# Patient Record
Sex: MALE | Race: BLACK OR AFRICAN AMERICAN | NOT HISPANIC OR LATINO | Employment: UNEMPLOYED | URBAN - METROPOLITAN AREA
[De-identification: names, ages, dates, MRNs, and addresses within clinical notes are randomized per-mention and may not be internally consistent; named-entity substitution may affect disease eponyms.]

---

## 2017-01-04 ENCOUNTER — HOSPITAL ENCOUNTER (INPATIENT)
Facility: HOSPITAL | Age: 42
LOS: 8 days | Discharge: HOME | DRG: 885 | End: 2017-01-13
Attending: EMERGENCY MEDICINE | Admitting: PSYCHIATRY & NEUROLOGY
Payer: COMMERCIAL

## 2017-01-04 DIAGNOSIS — R74.8 ELEVATED LIVER ENZYMES: ICD-10-CM

## 2017-01-04 DIAGNOSIS — F32.A DEPRESSION: ICD-10-CM

## 2017-01-04 DIAGNOSIS — M79.2 NEUROPATHIC PAIN SYNDROME (NON-HERPETIC): ICD-10-CM

## 2017-01-04 DIAGNOSIS — Z72.0 TOBACCO USE: ICD-10-CM

## 2017-01-04 DIAGNOSIS — E55.9 VITAMIN D DEFICIENCY: ICD-10-CM

## 2017-01-04 DIAGNOSIS — Z86.711 HISTORY OF PULMONARY EMBOLISM: ICD-10-CM

## 2017-01-04 DIAGNOSIS — F31.64 BIPOLAR DISORDER, CURRENT EPISODE MIXED, SEVERE, WITH PSYCHOTIC FEATURES (HCC): Primary | ICD-10-CM

## 2017-01-04 DIAGNOSIS — B19.20 HEPATITIS C VIRUS INFECTION WITHOUT HEPATIC COMA, UNSPECIFIED CHRONICITY: Chronic | ICD-10-CM

## 2017-01-04 LAB
AMPHETAMINES SERPL QL SCN: NEGATIVE
BACTERIA UR QL AUTO: ABNORMAL /HPF
BARBITURATES UR QL: NEGATIVE
BENZODIAZ UR QL: NEGATIVE
BILIRUB UR QL STRIP: ABNORMAL
CLARITY UR: CLEAR
CLARITY, POC: CLEAR
COCAINE UR QL: POSITIVE
COLOR UR: ABNORMAL
COLOR, POC: NORMAL
ETHANOL EXG-MCNC: 0 MG/DL
GLUCOSE UR STRIP-MCNC: NEGATIVE MG/DL
HGB UR QL STRIP.AUTO: NEGATIVE
HYALINE CASTS #/AREA URNS LPF: ABNORMAL /LPF
KETONES UR STRIP-MCNC: ABNORMAL MG/DL
LEUKOCYTE ESTERASE UR QL STRIP: NEGATIVE
METHADONE UR QL: NEGATIVE
NITRITE UR QL STRIP: NEGATIVE
NON-SQ EPI CELLS URNS QL MICRO: ABNORMAL /HPF
OPIATES UR QL SCN: NEGATIVE
PCP UR QL: NEGATIVE
PH UR STRIP.AUTO: 5.5 [PH] (ref 4.5–8)
PROT UR STRIP-MCNC: ABNORMAL MG/DL
RBC #/AREA URNS AUTO: ABNORMAL /HPF
SP GR UR STRIP.AUTO: >=1.03 (ref 1–1.03)
THC UR QL: POSITIVE
UROBILINOGEN UR QL STRIP.AUTO: 4 E.U./DL
WBC #/AREA URNS AUTO: ABNORMAL /HPF

## 2017-01-04 PROCEDURE — 80307 DRUG TEST PRSMV CHEM ANLYZR: CPT | Performed by: EMERGENCY MEDICINE

## 2017-01-04 PROCEDURE — 82075 ASSAY OF BREATH ETHANOL: CPT | Performed by: EMERGENCY MEDICINE

## 2017-01-04 PROCEDURE — 81002 URINALYSIS NONAUTO W/O SCOPE: CPT | Performed by: EMERGENCY MEDICINE

## 2017-01-04 PROCEDURE — 81001 URINALYSIS AUTO W/SCOPE: CPT

## 2017-01-05 PROCEDURE — 99285 EMERGENCY DEPT VISIT HI MDM: CPT

## 2017-01-05 RX ORDER — OLANZAPINE 5 MG/1
5 TABLET ORAL
Status: DISCONTINUED | OUTPATIENT
Start: 2017-01-05 | End: 2017-01-13 | Stop reason: HOSPADM

## 2017-01-05 RX ORDER — HALOPERIDOL 5 MG
5 TABLET ORAL EVERY 8 HOURS PRN
Status: DISCONTINUED | OUTPATIENT
Start: 2017-01-05 | End: 2017-01-13 | Stop reason: HOSPADM

## 2017-01-05 RX ORDER — BENZTROPINE MESYLATE 1 MG/ML
1 INJECTION INTRAMUSCULAR; INTRAVENOUS
Status: DISCONTINUED | OUTPATIENT
Start: 2017-01-05 | End: 2017-01-13 | Stop reason: HOSPADM

## 2017-01-05 RX ORDER — OLANZAPINE 10 MG/1
10 INJECTION, POWDER, LYOPHILIZED, FOR SOLUTION INTRAMUSCULAR
Status: DISCONTINUED | OUTPATIENT
Start: 2017-01-05 | End: 2017-01-13 | Stop reason: HOSPADM

## 2017-01-05 RX ORDER — LIDOCAINE 50 MG/G
1 PATCH TOPICAL DAILY
Status: DISCONTINUED | OUTPATIENT
Start: 2017-01-06 | End: 2017-01-13 | Stop reason: HOSPADM

## 2017-01-05 RX ORDER — BENZTROPINE MESYLATE 1 MG/1
1 TABLET ORAL
Status: DISCONTINUED | OUTPATIENT
Start: 2017-01-05 | End: 2017-01-13 | Stop reason: HOSPADM

## 2017-01-05 RX ADMIN — APIXABAN 5 MG: 5 TABLET, FILM COATED ORAL at 15:12

## 2017-01-05 RX ADMIN — APIXABAN 5 MG: 5 TABLET, FILM COATED ORAL at 18:51

## 2017-01-06 PROBLEM — F33.2 MDD (MAJOR DEPRESSIVE DISORDER), RECURRENT SEVERE, WITHOUT PSYCHOSIS (HCC): Status: ACTIVE | Noted: 2017-01-06

## 2017-01-06 PROBLEM — F43.10 PTSD (POST-TRAUMATIC STRESS DISORDER): Status: ACTIVE | Noted: 2017-01-06

## 2017-01-06 LAB
25(OH)D3 SERPL-MCNC: 9.2 NG/ML (ref 30–100)
ALBUMIN SERPL BCP-MCNC: 3.1 G/DL (ref 3.5–5)
ALP SERPL-CCNC: 62 U/L (ref 46–116)
ALT SERPL W P-5'-P-CCNC: 266 U/L (ref 12–78)
ANION GAP SERPL CALCULATED.3IONS-SCNC: 5 MMOL/L (ref 4–13)
AST SERPL W P-5'-P-CCNC: 125 U/L (ref 5–45)
BASOPHILS # BLD AUTO: 0.04 THOUSANDS/ΜL (ref 0–0.1)
BASOPHILS NFR BLD AUTO: 1 % (ref 0–1)
BILIRUB SERPL-MCNC: 0.23 MG/DL (ref 0.2–1)
BUN SERPL-MCNC: 11 MG/DL (ref 5–25)
CALCIUM SERPL-MCNC: 8.8 MG/DL (ref 8.3–10.1)
CHLORIDE SERPL-SCNC: 106 MMOL/L (ref 100–108)
CHOLEST SERPL-MCNC: 173 MG/DL (ref 50–200)
CO2 SERPL-SCNC: 31 MMOL/L (ref 21–32)
CREAT SERPL-MCNC: 1.18 MG/DL (ref 0.6–1.3)
EOSINOPHIL # BLD AUTO: 0.33 THOUSAND/ΜL (ref 0–0.61)
EOSINOPHIL NFR BLD AUTO: 5 % (ref 0–6)
ERYTHROCYTE [DISTWIDTH] IN BLOOD BY AUTOMATED COUNT: 13.4 % (ref 11.6–15.1)
FOLATE SERPL-MCNC: 9.1 NG/ML (ref 3.1–17.5)
GFR SERPL CREATININE-BSD FRML MDRD: >60 ML/MIN/1.73SQ M
GLUCOSE SERPL-MCNC: 89 MG/DL (ref 65–140)
HCT VFR BLD AUTO: 42.6 % (ref 36.5–49.3)
HDLC SERPL-MCNC: 41 MG/DL (ref 40–60)
HGB BLD-MCNC: 14 G/DL (ref 12–17)
LDLC SERPL CALC-MCNC: 114 MG/DL (ref 0–100)
LYMPHOCYTES # BLD AUTO: 2.67 THOUSANDS/ΜL (ref 0.6–4.47)
LYMPHOCYTES NFR BLD AUTO: 37 % (ref 14–44)
MCH RBC QN AUTO: 29.5 PG (ref 26.8–34.3)
MCHC RBC AUTO-ENTMCNC: 32.9 G/DL (ref 31.4–37.4)
MCV RBC AUTO: 90 FL (ref 82–98)
MONOCYTES # BLD AUTO: 1.16 THOUSAND/ΜL (ref 0.17–1.22)
MONOCYTES NFR BLD AUTO: 16 % (ref 4–12)
NEUTROPHILS # BLD AUTO: 3.07 THOUSANDS/ΜL (ref 1.85–7.62)
NEUTS SEG NFR BLD AUTO: 41 % (ref 43–75)
NRBC BLD AUTO-RTO: 0 /100 WBCS
PLATELET # BLD AUTO: 315 THOUSANDS/UL (ref 149–390)
PMV BLD AUTO: 9.3 FL (ref 8.9–12.7)
POTASSIUM SERPL-SCNC: 4.5 MMOL/L (ref 3.5–5.3)
PROT SERPL-MCNC: 7.3 G/DL (ref 6.4–8.2)
RBC # BLD AUTO: 4.74 MILLION/UL (ref 3.88–5.62)
SODIUM SERPL-SCNC: 142 MMOL/L (ref 136–145)
TRIGL SERPL-MCNC: 90 MG/DL
VIT B12 SERPL-MCNC: 573 PG/ML (ref 100–900)
WBC # BLD AUTO: 7.28 THOUSAND/UL (ref 4.31–10.16)

## 2017-01-06 PROCEDURE — 85025 COMPLETE CBC W/AUTO DIFF WBC: CPT | Performed by: PSYCHIATRY & NEUROLOGY

## 2017-01-06 PROCEDURE — 82746 ASSAY OF FOLIC ACID SERUM: CPT | Performed by: PSYCHIATRY & NEUROLOGY

## 2017-01-06 PROCEDURE — 80061 LIPID PANEL: CPT | Performed by: PSYCHIATRY & NEUROLOGY

## 2017-01-06 PROCEDURE — 82607 VITAMIN B-12: CPT | Performed by: PSYCHIATRY & NEUROLOGY

## 2017-01-06 PROCEDURE — 82306 VITAMIN D 25 HYDROXY: CPT | Performed by: PSYCHIATRY & NEUROLOGY

## 2017-01-06 PROCEDURE — 80053 COMPREHEN METABOLIC PANEL: CPT | Performed by: PSYCHIATRY & NEUROLOGY

## 2017-01-06 RX ORDER — MIRTAZAPINE 15 MG/1
15 TABLET, FILM COATED ORAL
Status: DISCONTINUED | OUTPATIENT
Start: 2017-01-06 | End: 2017-01-08

## 2017-01-06 RX ADMIN — MIRTAZAPINE 15 MG: 15 TABLET, FILM COATED ORAL at 21:33

## 2017-01-06 RX ADMIN — APIXABAN 5 MG: 5 TABLET, FILM COATED ORAL at 08:36

## 2017-01-06 RX ADMIN — LIDOCAINE 1 PATCH: 50 PATCH CUTANEOUS at 08:36

## 2017-01-06 RX ADMIN — APIXABAN 5 MG: 5 TABLET, FILM COATED ORAL at 17:17

## 2017-01-07 PROBLEM — S06.0XAA CONCUSSION INJURY OF BODY STRUCTURE: Status: ACTIVE | Noted: 2017-01-07

## 2017-01-07 PROBLEM — Z72.0 TOBACCO USE: Status: ACTIVE | Noted: 2017-01-07

## 2017-01-07 PROBLEM — S06.0X9A CONCUSSION INJURY OF BODY STRUCTURE: Status: ACTIVE | Noted: 2017-01-07

## 2017-01-07 PROBLEM — F12.10 CANNABIS ABUSE, EPISODIC: Chronic | Status: ACTIVE | Noted: 2017-01-07

## 2017-01-07 PROBLEM — S69.90XA HAND INJURIES: Status: ACTIVE | Noted: 2017-01-07

## 2017-01-07 PROBLEM — F43.10 PTSD (POST-TRAUMATIC STRESS DISORDER): Chronic | Status: ACTIVE | Noted: 2017-01-06

## 2017-01-07 PROBLEM — F33.3 MAJOR DEPRESSIVE DISORDER, RECURRENT, SEVERE WITH PSYCHOTIC FEATURES (HCC): Chronic | Status: ACTIVE | Noted: 2017-01-06

## 2017-01-07 PROBLEM — F14.10 COCAINE ABUSE, EPISODIC (HCC): Chronic | Status: ACTIVE | Noted: 2017-01-07

## 2017-01-07 RX ORDER — PALIPERIDONE 3 MG/1
3 TABLET, EXTENDED RELEASE ORAL DAILY
Status: DISCONTINUED | OUTPATIENT
Start: 2017-01-07 | End: 2017-01-08

## 2017-01-07 RX ORDER — MELATONIN
1000 DAILY
Status: DISCONTINUED | OUTPATIENT
Start: 2017-01-07 | End: 2017-01-13 | Stop reason: HOSPADM

## 2017-01-07 RX ORDER — GABAPENTIN 100 MG/1
100 CAPSULE ORAL 3 TIMES DAILY
Status: DISCONTINUED | OUTPATIENT
Start: 2017-01-07 | End: 2017-01-09

## 2017-01-07 RX ADMIN — MIRTAZAPINE 15 MG: 15 TABLET, FILM COATED ORAL at 21:29

## 2017-01-07 RX ADMIN — GABAPENTIN 100 MG: 100 CAPSULE ORAL at 21:29

## 2017-01-07 RX ADMIN — VITAMIN D, TAB 1000IU (100/BT) 1000 UNITS: 25 TAB at 21:28

## 2017-01-07 RX ADMIN — LIDOCAINE 1 PATCH: 50 PATCH CUTANEOUS at 09:08

## 2017-01-07 RX ADMIN — PALIPERIDONE 3 MG: 3 TABLET, EXTENDED RELEASE ORAL at 14:01

## 2017-01-07 RX ADMIN — GABAPENTIN 100 MG: 100 CAPSULE ORAL at 14:00

## 2017-01-07 RX ADMIN — APIXABAN 5 MG: 5 TABLET, FILM COATED ORAL at 09:08

## 2017-01-07 RX ADMIN — APIXABAN 5 MG: 5 TABLET, FILM COATED ORAL at 17:57

## 2017-01-07 RX ADMIN — GABAPENTIN 100 MG: 100 CAPSULE ORAL at 16:36

## 2017-01-08 RX ORDER — PALIPERIDONE 6 MG/1
6 TABLET, EXTENDED RELEASE ORAL DAILY
Status: DISCONTINUED | OUTPATIENT
Start: 2017-01-09 | End: 2017-01-10

## 2017-01-08 RX ORDER — MIRTAZAPINE 30 MG/1
30 TABLET, FILM COATED ORAL
Status: DISCONTINUED | OUTPATIENT
Start: 2017-01-08 | End: 2017-01-09

## 2017-01-08 RX ADMIN — VITAMIN D, TAB 1000IU (100/BT) 1000 UNITS: 25 TAB at 08:55

## 2017-01-08 RX ADMIN — APIXABAN 5 MG: 5 TABLET, FILM COATED ORAL at 08:56

## 2017-01-08 RX ADMIN — PALIPERIDONE 3 MG: 3 TABLET, EXTENDED RELEASE ORAL at 08:55

## 2017-01-08 RX ADMIN — GABAPENTIN 100 MG: 100 CAPSULE ORAL at 08:55

## 2017-01-08 RX ADMIN — GABAPENTIN 100 MG: 100 CAPSULE ORAL at 16:50

## 2017-01-08 RX ADMIN — APIXABAN 5 MG: 5 TABLET, FILM COATED ORAL at 18:06

## 2017-01-08 RX ADMIN — MIRTAZAPINE 30 MG: 30 TABLET, FILM COATED ORAL at 21:34

## 2017-01-08 RX ADMIN — LIDOCAINE 1 PATCH: 50 PATCH CUTANEOUS at 08:55

## 2017-01-08 RX ADMIN — GABAPENTIN 100 MG: 100 CAPSULE ORAL at 21:32

## 2017-01-09 RX ORDER — GABAPENTIN 300 MG/1
300 CAPSULE ORAL 3 TIMES DAILY
Status: DISCONTINUED | OUTPATIENT
Start: 2017-01-09 | End: 2017-01-10

## 2017-01-09 RX ADMIN — APIXABAN 5 MG: 5 TABLET, FILM COATED ORAL at 08:13

## 2017-01-09 RX ADMIN — MIRTAZAPINE 45 MG: 15 TABLET, FILM COATED ORAL at 21:30

## 2017-01-09 RX ADMIN — GABAPENTIN 300 MG: 300 CAPSULE ORAL at 21:30

## 2017-01-09 RX ADMIN — PALIPERIDONE 6 MG: 6 TABLET, EXTENDED RELEASE ORAL at 09:10

## 2017-01-09 RX ADMIN — GABAPENTIN 100 MG: 100 CAPSULE ORAL at 08:13

## 2017-01-09 RX ADMIN — LIDOCAINE 1 PATCH: 50 PATCH CUTANEOUS at 08:13

## 2017-01-09 RX ADMIN — APIXABAN 5 MG: 5 TABLET, FILM COATED ORAL at 17:08

## 2017-01-09 RX ADMIN — VITAMIN D, TAB 1000IU (100/BT) 1000 UNITS: 25 TAB at 08:13

## 2017-01-09 RX ADMIN — GABAPENTIN 300 MG: 300 CAPSULE ORAL at 16:20

## 2017-01-10 LAB
ALBUMIN SERPL BCP-MCNC: 3.1 G/DL (ref 3.5–5)
ALP SERPL-CCNC: 78 U/L (ref 46–116)
ALT SERPL W P-5'-P-CCNC: 433 U/L (ref 12–78)
AST SERPL W P-5'-P-CCNC: 190 U/L (ref 5–45)
BILIRUB DIRECT SERPL-MCNC: 0.16 MG/DL (ref 0–0.2)
BILIRUB SERPL-MCNC: 0.34 MG/DL (ref 0.2–1)
PROT SERPL-MCNC: 7.6 G/DL (ref 6.4–8.2)

## 2017-01-10 PROCEDURE — 80076 HEPATIC FUNCTION PANEL: CPT | Performed by: PSYCHIATRY & NEUROLOGY

## 2017-01-10 RX ORDER — RISPERIDONE 1 MG/1
2 TABLET, FILM COATED ORAL
Status: DISCONTINUED | OUTPATIENT
Start: 2017-01-10 | End: 2017-01-13 | Stop reason: HOSPADM

## 2017-01-10 RX ORDER — GABAPENTIN 400 MG/1
400 CAPSULE ORAL 3 TIMES DAILY
Status: DISCONTINUED | OUTPATIENT
Start: 2017-01-10 | End: 2017-01-13 | Stop reason: HOSPADM

## 2017-01-10 RX ORDER — MIRTAZAPINE 30 MG/1
30 TABLET, FILM COATED ORAL
Status: DISCONTINUED | OUTPATIENT
Start: 2017-01-10 | End: 2017-01-12

## 2017-01-10 RX ADMIN — RISPERIDONE 2 MG: 1 TABLET ORAL at 21:27

## 2017-01-10 RX ADMIN — PALIPERIDONE 6 MG: 6 TABLET, EXTENDED RELEASE ORAL at 08:12

## 2017-01-10 RX ADMIN — GABAPENTIN 400 MG: 400 CAPSULE ORAL at 21:27

## 2017-01-10 RX ADMIN — GABAPENTIN 400 MG: 400 CAPSULE ORAL at 17:00

## 2017-01-10 RX ADMIN — APIXABAN 5 MG: 5 TABLET, FILM COATED ORAL at 08:12

## 2017-01-10 RX ADMIN — MIRTAZAPINE 30 MG: 30 TABLET, FILM COATED ORAL at 21:27

## 2017-01-10 RX ADMIN — APIXABAN 5 MG: 5 TABLET, FILM COATED ORAL at 18:16

## 2017-01-10 RX ADMIN — GABAPENTIN 300 MG: 300 CAPSULE ORAL at 08:12

## 2017-01-10 RX ADMIN — VITAMIN D, TAB 1000IU (100/BT) 1000 UNITS: 25 TAB at 08:12

## 2017-01-10 RX ADMIN — LIDOCAINE 1 PATCH: 50 PATCH CUTANEOUS at 08:12

## 2017-01-11 RX ADMIN — APIXABAN 5 MG: 5 TABLET, FILM COATED ORAL at 09:57

## 2017-01-11 RX ADMIN — GABAPENTIN 400 MG: 400 CAPSULE ORAL at 21:30

## 2017-01-11 RX ADMIN — RISPERIDONE 2 MG: 1 TABLET ORAL at 21:30

## 2017-01-11 RX ADMIN — MIRTAZAPINE 30 MG: 30 TABLET, FILM COATED ORAL at 21:30

## 2017-01-11 RX ADMIN — APIXABAN 5 MG: 5 TABLET, FILM COATED ORAL at 17:56

## 2017-01-11 RX ADMIN — LIDOCAINE 1 PATCH: 50 PATCH CUTANEOUS at 09:04

## 2017-01-11 RX ADMIN — VITAMIN D, TAB 1000IU (100/BT) 1000 UNITS: 25 TAB at 09:04

## 2017-01-11 RX ADMIN — GABAPENTIN 400 MG: 400 CAPSULE ORAL at 09:04

## 2017-01-11 RX ADMIN — GABAPENTIN 400 MG: 400 CAPSULE ORAL at 16:46

## 2017-01-12 ENCOUNTER — APPOINTMENT (INPATIENT)
Dept: RADIOLOGY | Facility: HOSPITAL | Age: 42
DRG: 885 | End: 2017-01-12
Payer: COMMERCIAL

## 2017-01-12 LAB
ALBUMIN SERPL BCP-MCNC: 3.1 G/DL (ref 3.5–5)
ALP SERPL-CCNC: 79 U/L (ref 46–116)
ALT SERPL W P-5'-P-CCNC: 503 U/L (ref 12–78)
ANION GAP SERPL CALCULATED.3IONS-SCNC: 6 MMOL/L (ref 4–13)
AST SERPL W P-5'-P-CCNC: 220 U/L (ref 5–45)
BILIRUB SERPL-MCNC: 0.27 MG/DL (ref 0.2–1)
BUN SERPL-MCNC: 10 MG/DL (ref 5–25)
CALCIUM SERPL-MCNC: 8.6 MG/DL (ref 8.3–10.1)
CHLORIDE SERPL-SCNC: 104 MMOL/L (ref 100–108)
CO2 SERPL-SCNC: 31 MMOL/L (ref 21–32)
CREAT SERPL-MCNC: 1.06 MG/DL (ref 0.6–1.3)
GFR SERPL CREATININE-BSD FRML MDRD: >60 ML/MIN/1.73SQ M
GLUCOSE SERPL-MCNC: 103 MG/DL (ref 65–140)
POTASSIUM SERPL-SCNC: 4.1 MMOL/L (ref 3.5–5.3)
PROT SERPL-MCNC: 7.2 G/DL (ref 6.4–8.2)
SODIUM SERPL-SCNC: 141 MMOL/L (ref 136–145)

## 2017-01-12 PROCEDURE — 86038 ANTINUCLEAR ANTIBODIES: CPT | Performed by: INTERNAL MEDICINE

## 2017-01-12 PROCEDURE — 86235 NUCLEAR ANTIGEN ANTIBODY: CPT | Performed by: INTERNAL MEDICINE

## 2017-01-12 PROCEDURE — 76705 ECHO EXAM OF ABDOMEN: CPT

## 2017-01-12 PROCEDURE — 86376 MICROSOMAL ANTIBODY EACH: CPT | Performed by: INTERNAL MEDICINE

## 2017-01-12 PROCEDURE — 80053 COMPREHEN METABOLIC PANEL: CPT | Performed by: PHYSICIAN ASSISTANT

## 2017-01-12 RX ORDER — MIRTAZAPINE 15 MG/1
7.5 TABLET, FILM COATED ORAL
Status: DISCONTINUED | OUTPATIENT
Start: 2017-01-12 | End: 2017-01-13

## 2017-01-12 RX ORDER — MIRTAZAPINE 15 MG/1
15 TABLET, FILM COATED ORAL
Status: DISCONTINUED | OUTPATIENT
Start: 2017-01-12 | End: 2017-01-12

## 2017-01-12 RX ADMIN — RISPERIDONE 2 MG: 1 TABLET ORAL at 21:31

## 2017-01-12 RX ADMIN — APIXABAN 5 MG: 5 TABLET, FILM COATED ORAL at 17:06

## 2017-01-12 RX ADMIN — MIRTAZAPINE 7.5 MG: 15 TABLET, FILM COATED ORAL at 21:33

## 2017-01-12 RX ADMIN — GABAPENTIN 400 MG: 400 CAPSULE ORAL at 16:36

## 2017-01-12 RX ADMIN — APIXABAN 5 MG: 5 TABLET, FILM COATED ORAL at 09:06

## 2017-01-12 RX ADMIN — GABAPENTIN 400 MG: 400 CAPSULE ORAL at 09:06

## 2017-01-12 RX ADMIN — VITAMIN D, TAB 1000IU (100/BT) 1000 UNITS: 25 TAB at 09:06

## 2017-01-12 RX ADMIN — LIDOCAINE 1 PATCH: 50 PATCH CUTANEOUS at 09:06

## 2017-01-12 RX ADMIN — GABAPENTIN 400 MG: 400 CAPSULE ORAL at 21:31

## 2017-01-13 VITALS
BODY MASS INDEX: 29.99 KG/M2 | RESPIRATION RATE: 16 BRPM | HEART RATE: 72 BPM | HEIGHT: 65 IN | OXYGEN SATURATION: 100 % | TEMPERATURE: 98.3 F | DIASTOLIC BLOOD PRESSURE: 70 MMHG | WEIGHT: 180 LBS | SYSTOLIC BLOOD PRESSURE: 127 MMHG

## 2017-01-13 LAB
ACTIN IGG SERPL-ACNC: 13 UNITS (ref 0–19)
ALBUMIN SERPL BCP-MCNC: 2.9 G/DL (ref 3.5–5)
ALP SERPL-CCNC: 81 U/L (ref 46–116)
ALT SERPL W P-5'-P-CCNC: 521 U/L (ref 12–78)
AST SERPL W P-5'-P-CCNC: 219 U/L (ref 5–45)
BILIRUB DIRECT SERPL-MCNC: 0.11 MG/DL (ref 0–0.2)
BILIRUB SERPL-MCNC: 0.22 MG/DL (ref 0.2–1)
LKM-1 AB SER-ACNC: 0.7 UNITS (ref 0–20)
PROT SERPL-MCNC: 7.2 G/DL (ref 6.4–8.2)
RYE IGE QN: NEGATIVE

## 2017-01-13 PROCEDURE — 80076 HEPATIC FUNCTION PANEL: CPT | Performed by: PSYCHIATRY & NEUROLOGY

## 2017-01-13 RX ORDER — GABAPENTIN 400 MG/1
400 CAPSULE ORAL 3 TIMES DAILY
Qty: 90 CAPSULE | Refills: 0 | Status: SHIPPED | OUTPATIENT
Start: 2017-01-13 | End: 2017-03-31 | Stop reason: HOSPADM

## 2017-01-13 RX ORDER — RISPERIDONE 2 MG/1
2 TABLET, FILM COATED ORAL
Qty: 30 TABLET | Refills: 0 | Status: SHIPPED | OUTPATIENT
Start: 2017-01-13 | End: 2017-03-31 | Stop reason: HOSPADM

## 2017-01-13 RX ORDER — LIDOCAINE 50 MG/G
1 PATCH TOPICAL DAILY
Qty: 30 PATCH | Refills: 0 | Status: SHIPPED | OUTPATIENT
Start: 2017-01-13 | End: 2017-03-31 | Stop reason: HOSPADM

## 2017-01-13 RX ADMIN — APIXABAN 5 MG: 5 TABLET, FILM COATED ORAL at 09:16

## 2017-01-13 RX ADMIN — GABAPENTIN 400 MG: 400 CAPSULE ORAL at 09:16

## 2017-01-13 RX ADMIN — LIDOCAINE 1 PATCH: 50 PATCH CUTANEOUS at 09:21

## 2017-01-13 RX ADMIN — VITAMIN D, TAB 1000IU (100/BT) 1000 UNITS: 25 TAB at 09:16

## 2017-01-16 DIAGNOSIS — B19.20 VIRAL HEPATITIS C WITHOUT HEPATIC COMA: ICD-10-CM

## 2017-03-30 ENCOUNTER — APPOINTMENT (EMERGENCY)
Dept: RADIOLOGY | Facility: HOSPITAL | Age: 42
End: 2017-03-30
Payer: COMMERCIAL

## 2017-03-30 ENCOUNTER — HOSPITAL ENCOUNTER (OUTPATIENT)
Facility: HOSPITAL | Age: 42
Setting detail: OBSERVATION
Discharge: HOME | End: 2017-03-31
Attending: EMERGENCY MEDICINE | Admitting: INTERNAL MEDICINE
Payer: COMMERCIAL

## 2017-03-30 DIAGNOSIS — I26.99 PULMONARY EMBOLISM (HCC): Primary | ICD-10-CM

## 2017-03-30 DIAGNOSIS — R06.02 SHORTNESS OF BREATH: ICD-10-CM

## 2017-03-30 DIAGNOSIS — B19.20 HEPATITIS C: ICD-10-CM

## 2017-03-30 DIAGNOSIS — R07.9 CHEST PAIN: ICD-10-CM

## 2017-03-30 LAB
BASOPHILS # BLD AUTO: 0.02 THOUSANDS/ΜL (ref 0–0.1)
BASOPHILS NFR BLD AUTO: 0 % (ref 0–1)
EOSINOPHIL # BLD AUTO: 0.11 THOUSAND/ΜL (ref 0–0.61)
EOSINOPHIL NFR BLD AUTO: 1 % (ref 0–6)
ERYTHROCYTE [DISTWIDTH] IN BLOOD BY AUTOMATED COUNT: 14 % (ref 11.6–15.1)
HCT VFR BLD AUTO: 45.3 % (ref 36.5–49.3)
HGB BLD-MCNC: 15.3 G/DL (ref 12–17)
LYMPHOCYTES # BLD AUTO: 2.16 THOUSANDS/ΜL (ref 0.6–4.47)
LYMPHOCYTES NFR BLD AUTO: 22 % (ref 14–44)
MCH RBC QN AUTO: 30.5 PG (ref 26.8–34.3)
MCHC RBC AUTO-ENTMCNC: 33.8 G/DL (ref 31.4–37.4)
MCV RBC AUTO: 90 FL (ref 82–98)
MONOCYTES # BLD AUTO: 0.87 THOUSAND/ΜL (ref 0.17–1.22)
MONOCYTES NFR BLD AUTO: 9 % (ref 4–12)
NEUTROPHILS # BLD AUTO: 6.68 THOUSANDS/ΜL (ref 1.85–7.62)
NEUTS SEG NFR BLD AUTO: 68 % (ref 43–75)
NRBC BLD AUTO-RTO: 0 /100 WBCS
PLATELET # BLD AUTO: 369 THOUSANDS/UL (ref 149–390)
PMV BLD AUTO: 8.8 FL (ref 8.9–12.7)
RBC # BLD AUTO: 5.02 MILLION/UL (ref 3.88–5.62)
SPECIMEN SOURCE: NORMAL
TROPONIN I BLD-MCNC: 0 NG/ML (ref 0–0.08)
WBC # BLD AUTO: 9.85 THOUSAND/UL (ref 4.31–10.16)

## 2017-03-30 PROCEDURE — 80048 BASIC METABOLIC PNL TOTAL CA: CPT | Performed by: EMERGENCY MEDICINE

## 2017-03-30 PROCEDURE — 36415 COLL VENOUS BLD VENIPUNCTURE: CPT | Performed by: EMERGENCY MEDICINE

## 2017-03-30 PROCEDURE — 93005 ELECTROCARDIOGRAM TRACING: CPT | Performed by: EMERGENCY MEDICINE

## 2017-03-30 PROCEDURE — 85025 COMPLETE CBC W/AUTO DIFF WBC: CPT | Performed by: EMERGENCY MEDICINE

## 2017-03-30 PROCEDURE — 84484 ASSAY OF TROPONIN QUANT: CPT

## 2017-03-31 ENCOUNTER — APPOINTMENT (EMERGENCY)
Dept: RADIOLOGY | Facility: HOSPITAL | Age: 42
End: 2017-03-31
Payer: COMMERCIAL

## 2017-03-31 VITALS
BODY MASS INDEX: 31.11 KG/M2 | TEMPERATURE: 96.8 F | WEIGHT: 186.73 LBS | DIASTOLIC BLOOD PRESSURE: 58 MMHG | SYSTOLIC BLOOD PRESSURE: 108 MMHG | HEIGHT: 65 IN | OXYGEN SATURATION: 98 % | HEART RATE: 83 BPM | RESPIRATION RATE: 18 BRPM

## 2017-03-31 PROBLEM — F19.10 POLYSUBSTANCE ABUSE (HCC): Chronic | Status: ACTIVE | Noted: 2017-03-31

## 2017-03-31 LAB
ALBUMIN SERPL BCP-MCNC: 3 G/DL (ref 3.5–5)
ALP SERPL-CCNC: 64 U/L (ref 46–116)
ALT SERPL W P-5'-P-CCNC: 88 U/L (ref 12–78)
AMPHETAMINES SERPL QL SCN: NEGATIVE
ANION GAP SERPL CALCULATED.3IONS-SCNC: 6 MMOL/L (ref 4–13)
ANION GAP SERPL CALCULATED.3IONS-SCNC: 8 MMOL/L (ref 4–13)
AST SERPL W P-5'-P-CCNC: 21 U/L (ref 5–45)
ATRIAL RATE: 64 BPM
BARBITURATES UR QL: NEGATIVE
BENZODIAZ UR QL: NEGATIVE
BILIRUB SERPL-MCNC: 0.31 MG/DL (ref 0.2–1)
BUN SERPL-MCNC: 11 MG/DL (ref 5–25)
BUN SERPL-MCNC: 11 MG/DL (ref 5–25)
CALCIUM SERPL-MCNC: 8.6 MG/DL (ref 8.3–10.1)
CALCIUM SERPL-MCNC: 8.8 MG/DL (ref 8.3–10.1)
CHLORIDE SERPL-SCNC: 103 MMOL/L (ref 100–108)
CHLORIDE SERPL-SCNC: 104 MMOL/L (ref 100–108)
CO2 SERPL-SCNC: 30 MMOL/L (ref 21–32)
CO2 SERPL-SCNC: 32 MMOL/L (ref 21–32)
COCAINE UR QL: NEGATIVE
CREAT SERPL-MCNC: 1.04 MG/DL (ref 0.6–1.3)
CREAT SERPL-MCNC: 1.09 MG/DL (ref 0.6–1.3)
DEPRECATED AT III PPP: 92 % OF NORMAL (ref 92–136)
ERYTHROCYTE [DISTWIDTH] IN BLOOD BY AUTOMATED COUNT: 13.8 % (ref 11.6–15.1)
GFR SERPL CREATININE-BSD FRML MDRD: >60 ML/MIN/1.73SQ M
GFR SERPL CREATININE-BSD FRML MDRD: >60 ML/MIN/1.73SQ M
GLUCOSE SERPL-MCNC: 104 MG/DL (ref 65–140)
GLUCOSE SERPL-MCNC: 99 MG/DL (ref 65–140)
HCT VFR BLD AUTO: 43.8 % (ref 36.5–49.3)
HGB BLD-MCNC: 14.5 G/DL (ref 12–17)
HIV 1+2 AB+HIV1 P24 AG SERPL QL IA: NORMAL
HIV1 P24 AG SER QL: NORMAL
MCH RBC QN AUTO: 29.8 PG (ref 26.8–34.3)
MCHC RBC AUTO-ENTMCNC: 33.1 G/DL (ref 31.4–37.4)
MCV RBC AUTO: 90 FL (ref 82–98)
METHADONE UR QL: NEGATIVE
OPIATES UR QL SCN: POSITIVE
P AXIS: 72 DEGREES
PCP UR QL: POSITIVE
PLATELET # BLD AUTO: 367 THOUSANDS/UL (ref 149–390)
PMV BLD AUTO: 9.1 FL (ref 8.9–12.7)
POTASSIUM SERPL-SCNC: 3.6 MMOL/L (ref 3.5–5.3)
POTASSIUM SERPL-SCNC: 3.6 MMOL/L (ref 3.5–5.3)
PR INTERVAL: 142 MS
PROT SERPL-MCNC: 7.6 G/DL (ref 6.4–8.2)
QRS AXIS: 66 DEGREES
QRSD INTERVAL: 76 MS
QT INTERVAL: 402 MS
QTC INTERVAL: 414 MS
RBC # BLD AUTO: 4.86 MILLION/UL (ref 3.88–5.62)
SODIUM SERPL-SCNC: 141 MMOL/L (ref 136–145)
SODIUM SERPL-SCNC: 142 MMOL/L (ref 136–145)
T WAVE AXIS: 39 DEGREES
THC UR QL: POSITIVE
VENTRICULAR RATE: 64 BPM
WBC # BLD AUTO: 10.28 THOUSAND/UL (ref 4.31–10.16)

## 2017-03-31 PROCEDURE — 87806 HIV AG W/HIV1&2 ANTB W/OPTIC: CPT | Performed by: INTERNAL MEDICINE

## 2017-03-31 PROCEDURE — 85300 ANTITHROMBIN III ACTIVITY: CPT | Performed by: INTERNAL MEDICINE

## 2017-03-31 PROCEDURE — 85613 RUSSELL VIPER VENOM DILUTED: CPT | Performed by: INTERNAL MEDICINE

## 2017-03-31 PROCEDURE — 96372 THER/PROPH/DIAG INJ SC/IM: CPT

## 2017-03-31 PROCEDURE — 85732 THROMBOPLASTIN TIME PARTIAL: CPT | Performed by: INTERNAL MEDICINE

## 2017-03-31 PROCEDURE — 85306 CLOT INHIBIT PROT S FREE: CPT | Performed by: INTERNAL MEDICINE

## 2017-03-31 PROCEDURE — 71275 CT ANGIOGRAPHY CHEST: CPT

## 2017-03-31 PROCEDURE — 81241 F5 GENE: CPT | Performed by: INTERNAL MEDICINE

## 2017-03-31 PROCEDURE — 85670 THROMBIN TIME PLASMA: CPT | Performed by: INTERNAL MEDICINE

## 2017-03-31 PROCEDURE — 85305 CLOT INHIBIT PROT S TOTAL: CPT | Performed by: INTERNAL MEDICINE

## 2017-03-31 PROCEDURE — 86146 BETA-2 GLYCOPROTEIN ANTIBODY: CPT | Performed by: INTERNAL MEDICINE

## 2017-03-31 PROCEDURE — 36415 COLL VENOUS BLD VENIPUNCTURE: CPT | Performed by: INTERNAL MEDICINE

## 2017-03-31 PROCEDURE — 80053 COMPREHEN METABOLIC PANEL: CPT | Performed by: INTERNAL MEDICINE

## 2017-03-31 PROCEDURE — 99285 EMERGENCY DEPT VISIT HI MDM: CPT

## 2017-03-31 PROCEDURE — 85303 CLOT INHIBIT PROT C ACTIVITY: CPT | Performed by: INTERNAL MEDICINE

## 2017-03-31 PROCEDURE — 85705 THROMBOPLASTIN INHIBITION: CPT | Performed by: INTERNAL MEDICINE

## 2017-03-31 PROCEDURE — 80307 DRUG TEST PRSMV CHEM ANLYZR: CPT | Performed by: INTERNAL MEDICINE

## 2017-03-31 PROCEDURE — 96374 THER/PROPH/DIAG INJ IV PUSH: CPT

## 2017-03-31 PROCEDURE — 81240 F2 GENE: CPT | Performed by: INTERNAL MEDICINE

## 2017-03-31 PROCEDURE — 85027 COMPLETE CBC AUTOMATED: CPT | Performed by: INTERNAL MEDICINE

## 2017-03-31 PROCEDURE — 86147 CARDIOLIPIN ANTIBODY EA IG: CPT | Performed by: INTERNAL MEDICINE

## 2017-03-31 RX ORDER — GABAPENTIN 400 MG/1
400 CAPSULE ORAL 3 TIMES DAILY
Status: DISCONTINUED | OUTPATIENT
Start: 2017-03-31 | End: 2017-03-31

## 2017-03-31 RX ORDER — ONDANSETRON 2 MG/ML
4 INJECTION INTRAMUSCULAR; INTRAVENOUS EVERY 6 HOURS PRN
Status: DISCONTINUED | OUTPATIENT
Start: 2017-03-31 | End: 2017-03-31 | Stop reason: HOSPADM

## 2017-03-31 RX ORDER — RISPERIDONE 1 MG/1
2 TABLET, FILM COATED ORAL
Status: DISCONTINUED | OUTPATIENT
Start: 2017-03-31 | End: 2017-03-31

## 2017-03-31 RX ORDER — MELATONIN
1000 DAILY
Status: DISCONTINUED | OUTPATIENT
Start: 2017-03-31 | End: 2017-03-31

## 2017-03-31 RX ORDER — MORPHINE SULFATE 4 MG/ML
4 INJECTION, SOLUTION INTRAMUSCULAR; INTRAVENOUS ONCE
Status: COMPLETED | OUTPATIENT
Start: 2017-03-31 | End: 2017-03-31

## 2017-03-31 RX ADMIN — VITAMIN D, TAB 1000IU (100/BT) 1000 UNITS: 25 TAB at 08:42

## 2017-03-31 RX ADMIN — ENOXAPARIN SODIUM 80 MG: 80 INJECTION SUBCUTANEOUS at 08:41

## 2017-03-31 RX ADMIN — GABAPENTIN 400 MG: 400 CAPSULE ORAL at 08:42

## 2017-03-31 RX ADMIN — MORPHINE SULFATE 4 MG: 4 INJECTION, SOLUTION INTRAMUSCULAR; INTRAVENOUS at 01:48

## 2017-03-31 RX ADMIN — IOHEXOL 85 ML: 350 INJECTION, SOLUTION INTRAVENOUS at 00:27

## 2017-03-31 RX ADMIN — ENOXAPARIN SODIUM 80 MG: 80 INJECTION SUBCUTANEOUS at 01:49

## 2017-03-31 RX ADMIN — NICOTINE 1 PATCH: 7 PATCH, EXTENDED RELEASE TRANSDERMAL at 08:42

## 2017-04-01 LAB
CARDIOLIPIN IGA SER IA-ACNC: <9 APL U/ML (ref 0–11)
CARDIOLIPIN IGG SER IA-ACNC: <9 GPL U/ML (ref 0–14)
CARDIOLIPIN IGM SER IA-ACNC: <9 MPL U/ML (ref 0–12)

## 2017-04-02 LAB
PROT S ACT/NOR PPP: 79 % (ref 57–157)
PROT S PPP-ACNC: 109 % (ref 60–150)

## 2017-04-03 LAB
B2 GLYCOPROT1 IGA SER-ACNC: <9 GPI IGA UNITS (ref 0–25)
B2 GLYCOPROT1 IGG SER-ACNC: <9 GPI IGG UNITS (ref 0–20)
B2 GLYCOPROT1 IGM SER-ACNC: <9 GPI IGM UNITS (ref 0–32)
PROT C AG ACT/NOR PPP IA: 106 % OF NORMAL (ref 60–150)
PROT S ACT/NOR PPP: 105 % (ref 63–140)

## 2017-04-04 LAB
APTT SCREEN TO CONFIRM RATIO: 0.8 RATIO (ref 0–1.4)
CONFIRM APTT/NORMAL: 34.4 SEC (ref 0–55)
LA PPP-IMP: NORMAL
SCREEN APTT: 37.8 SEC (ref 0–43.6)
SCREEN DRVVT: 33.7 SEC (ref 0–44)
THROMBIN TIME: 18.2 SEC (ref 0–20.9)

## 2017-04-06 ENCOUNTER — ALLSCRIPTS OFFICE VISIT (OUTPATIENT)
Dept: OTHER | Facility: OTHER | Age: 42
End: 2017-04-06

## 2017-04-06 LAB
F5 GENE MUT ANL BLD/T: NORMAL
FACTOR V LEIDEN INTERPRETATION: NORMAL

## 2017-04-07 LAB
F2 GENE MUT ANL BLD/T: NORMAL
Lab: NORMAL

## 2017-04-11 ENCOUNTER — GENERIC CONVERSION - ENCOUNTER (OUTPATIENT)
Dept: OTHER | Facility: OTHER | Age: 42
End: 2017-04-11

## 2017-04-17 ENCOUNTER — GENERIC CONVERSION - ENCOUNTER (OUTPATIENT)
Dept: OTHER | Facility: OTHER | Age: 42
End: 2017-04-17

## 2017-05-11 ENCOUNTER — GENERIC CONVERSION - ENCOUNTER (OUTPATIENT)
Dept: OTHER | Facility: OTHER | Age: 42
End: 2017-05-11

## 2018-01-12 NOTE — MISCELLANEOUS
Provider Comments  Provider Comments:   Pt did not attend SW appointment this date SW attempted to call pt but had to leave a message with his Mother  SW will resubmit his [place] Application as they required an additional piece of information  Will have Staff offer pt to reschedule if he would like        Signatures   Electronically signed by : Bebeto Goodwin LCSW; May 11 2017  2:01PM EST                       (Author)
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
## 2018-01-13 VITALS
HEIGHT: 65 IN | DIASTOLIC BLOOD PRESSURE: 76 MMHG | WEIGHT: 185.19 LBS | BODY MASS INDEX: 30.85 KG/M2 | TEMPERATURE: 98.5 F | SYSTOLIC BLOOD PRESSURE: 108 MMHG | HEART RATE: 72 BPM

## 2018-01-16 NOTE — MISCELLANEOUS
Message  Patient has been approved for free Eliquis through [facility] patient assist program  Dustin Dose will be delivered to patient's home  Active Problems    1  Elevated LFTs (790 6) (R94 5)   2  HCV infection (070 70) (B19 20)   3  Injury of left knee, subsequent encounter (V58 89,959 7) (S89 92XD)   4  Polysubstance abuse (305 90) (F19 10)   5  PTSD (post-traumatic stress disorder) (309 81) (F43 10)   6  Pulmonary embolism (415 19) (I26 99)   7  Recurrent major depressive disorder, remission status unspecified (296 30) (F33 9)   8  Vitamin D deficiency (268 9) (E55 9)    Current Meds   1  Eliquis 5 MG Oral Tablet; Take 1 tablet twice daily; Therapy: 31GBF9270 to (Evaluate:03Oct2017); Last Rx:06Apr2017 Ordered    Allergies    1  No Known Drug Allergies    2  Bee sting   3   Milk    Signatures   Electronically signed by : Tari Oliveira, ; Apr 17 2017  9:19AM EST                       (Author)

## 2018-01-17 NOTE — RESULT NOTES
Verified Results  (1) TISSUE EXAM [id number] 12:19PM Sascha Pillsuzanne     Test Name Result Flag Reference   LAB AP CASE REPORT (Report)     Surgical Pathology Report             Case: [id number]                   Authorizing Provider: Gregory Regan MD      Collected:      11/10/2016 1219        Ordering Location:   [address] [place] [address]   Received:      11/11/2016 [facility] 206 9                                Pathologist:      uSsana Jose MD                                 Specimen:  Liver   LAB AP FINAL DIAGNOSIS (Report)     A  Liver (core needle biopsy): - Active hepatitis (grade 2-3)  - No significant fibrosis (stage 0)    Comment: The biopsy shows frequent necrotic hepatocytes without   centrizonal geographic necrosis  The features are consistent with chronic   active hepatitis C with the possibility of overlying drug-induced   hepatitis as the patient's history of illicit drug use as well as possible   excess exposure to Tylenol is noted  Suggest clinical correlation  Electronically signed by Susana Jose MD on 11/14/2016 at 3:44 PM   LAB AP MICROSCOPIC DESCRIPTION (Report)     Histologic sections show two (2) liver cores with intact architecture   demonstrating alternating portal tracts and central veins  Adequate   portal tracts are present displaying a moderate chronic inflammatory   infiltrate composed of a mixture of lymphocytes, plasma cells and   neutrophils which superficially spill over the limiting plate to involve   adjacent hepatocytes  Balloon degeneration is readily identified  Spotty   lobular inflammation is present along with frequent necrotic hepatocytes   and mild cholestasis  Central veins are identified without geographic   zonal necrosis identified  Bile ducts are identified in all portal tracts,   displaying no destructive changes or granulomas  Large and small droplet   steaosis comprises 10-20% of the liver cores   The iron stain shows no   significant hemosiderin deposition  The reticulin stain shows hepatic   plates of 1-2 cells in thickness  The trichrome stain shows no   significant fibrosis  LAB AP SURGICAL ADDITIONAL INFORMATION (Report)     These tests were developed and their performance characteristics   determined by [facility] or [address] [place] [address]  They may not be cleared or approved by the [place]  Food and   Drug Administration  The FDA has determined that such clearance or   approval is not necessary  These tests are used for clinical purposes  They should not be regarded as investigational or for research  This   laboratory has been approved by [place] [address], designated as a high-complexity   laboratory and is qualified to perform these tests  LAB AP GROSS DESCRIPTION (Report)     A  The specimen is received in formalin, labeled with the patient's name   and medical record number, and is designated liver   The specimen   consists of 2 rubbery tan-brown cylindrical core tissue biopsies measuring   from 0 7 up to 0 8 cm in length by 0 1 cm in average diameter  Entirely   submitted  One cassette  Note: The estimated total formalin fixation time based upon information   provided by the submitting clinician and the standard processing schedule   is 32 25 hours  SRS   LAB AP CLINICAL INFORMATION (Report)     hepatitis C, elevated LFT's    Per admit note: 39 y o  male patient who originally presented to the hospital on 11/2/2016 due to complaint of having withdrawal symptoms and was found to have elevated AST/ALTs in the emergency department   This is thought to be due to the hepatocellular pattern injury secondary to hepatitis C, polysubstance abuse including heroin, cocaine, marijuana, and overuse of Percocet and Tylenol
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
## 2018-03-27 ENCOUNTER — TELEPHONE (OUTPATIENT)
Dept: INTERNAL MEDICINE CLINIC | Facility: CLINIC | Age: 43
End: 2018-03-27

## 2018-03-29 NOTE — TELEPHONE ENCOUNTER
Micheal Ray  I am not in clinic today, but could print out an RX tomorrow  For 90 day supply?     Dr Cheyanne Stout

## 2018-03-30 DIAGNOSIS — I26.99 OTHER PULMONARY EMBOLISM WITHOUT ACUTE COR PULMONALE, UNSPECIFIED CHRONICITY (HCC): Primary | ICD-10-CM

## 2018-03-30 NOTE — TELEPHONE ENCOUNTER
Will Rx 30 day supply   Note pt has not been here in almost one year, so cannot continue to give Rxs w/o an evaluation      Dr Ted Cantrell

## 2018-04-16 DIAGNOSIS — I26.99 OTHER PULMONARY EMBOLISM WITHOUT ACUTE COR PULMONALE, UNSPECIFIED CHRONICITY (HCC): ICD-10-CM

## 2018-04-26 ENCOUNTER — TELEPHONE (OUTPATIENT)
Dept: INTERNAL MEDICINE CLINIC | Facility: CLINIC | Age: 43
End: 2018-04-26

## 2022-07-20 ENCOUNTER — OFFICE VISIT (OUTPATIENT)
Dept: FAMILY MEDICINE CLINIC | Facility: CLINIC | Age: 47
End: 2022-07-20
Payer: COMMERCIAL

## 2022-07-20 VITALS
HEART RATE: 80 BPM | TEMPERATURE: 98.2 F | SYSTOLIC BLOOD PRESSURE: 148 MMHG | HEIGHT: 64 IN | OXYGEN SATURATION: 97 % | BODY MASS INDEX: 38.24 KG/M2 | RESPIRATION RATE: 16 BRPM | WEIGHT: 224 LBS | DIASTOLIC BLOOD PRESSURE: 86 MMHG

## 2022-07-20 DIAGNOSIS — Z71.6 TOBACCO ABUSE COUNSELING: ICD-10-CM

## 2022-07-20 DIAGNOSIS — R53.83 FATIGUE, UNSPECIFIED TYPE: ICD-10-CM

## 2022-07-20 DIAGNOSIS — Z12.5 SCREENING FOR MALIGNANT NEOPLASM OF PROSTATE: ICD-10-CM

## 2022-07-20 DIAGNOSIS — E66.09 CLASS 2 OBESITY DUE TO EXCESS CALORIES WITHOUT SERIOUS COMORBIDITY WITH BODY MASS INDEX (BMI) OF 38.0 TO 38.9 IN ADULT: ICD-10-CM

## 2022-07-20 DIAGNOSIS — Z13.6 SCREENING FOR CARDIOVASCULAR CONDITION: ICD-10-CM

## 2022-07-20 DIAGNOSIS — I26.99 OTHER PULMONARY EMBOLISM WITHOUT ACUTE COR PULMONALE, UNSPECIFIED CHRONICITY (HCC): Primary | ICD-10-CM

## 2022-07-20 DIAGNOSIS — Z13.29 SCREENING FOR THYROID DISORDER: ICD-10-CM

## 2022-07-20 DIAGNOSIS — F17.210 NICOTINE DEPENDENCE, CIGARETTES, UNCOMPLICATED: ICD-10-CM

## 2022-07-20 PROBLEM — E66.812 CLASS 2 OBESITY DUE TO EXCESS CALORIES WITHOUT SERIOUS COMORBIDITY WITH BODY MASS INDEX (BMI) OF 38.0 TO 38.9 IN ADULT: Status: ACTIVE | Noted: 2022-07-20

## 2022-07-20 PROCEDURE — 99204 OFFICE O/P NEW MOD 45 MIN: CPT | Performed by: FAMILY MEDICINE

## 2022-07-20 PROCEDURE — 99406 BEHAV CHNG SMOKING 3-10 MIN: CPT | Performed by: FAMILY MEDICINE

## 2022-07-20 PROCEDURE — 3725F SCREEN DEPRESSION PERFORMED: CPT | Performed by: FAMILY MEDICINE

## 2022-07-20 RX ORDER — WARFARIN SODIUM 4 MG/1
TABLET ORAL
COMMUNITY
Start: 2019-04-15 | End: 2022-07-20 | Stop reason: ALTCHOICE

## 2022-07-20 NOTE — ASSESSMENT & PLAN NOTE
Patient is currently vaping  Patient currently uses nicotine products  Discussed vape cessation  Discussed that vaping can lead to increased risk for hypertension, hyperlipidemia, strokes, heart attacks, and different types of cancers such as lung and stomach cancer  Discussed medication options including Chantix and dual NRT with patches and/or month lozenges and gum      5 minutes spent on vaping cessation counseling

## 2022-07-20 NOTE — ASSESSMENT & PLAN NOTE
Discussed with patient the importance of medication adherence  Discussed lifestyle changes including diet and exercise  Diet should include switching from simple carbohydrates like white rice, white pasta, white bread to brown rice, wheat pasta, wheat bread  Increase exercise to 150 minutes per week, should include cardio and weightlifting    Offered nutritional counseling when patient is ready

## 2022-07-20 NOTE — PROGRESS NOTES
Assessment/Plan:    Pulmonary embolism without acute cor pulmonale ([facility] [address] )  Previous blood work reviewed and assessable notes reviewed  Patient has history of DVT and PE without any known cause  Unclear if he had any workup for hypercoagulability  Recommend transitioning from warfarin to Eliquis  Will start patient on Eliquis 10 mg b i d  for 1 week and then transition to 5 mg b i d   Advised patient to call office for new script when ready  Patient should be on maintenance dose of 5 mg b i d  for rest of life  Once patient is ready, may refer to Hematology for further workup if needed  Will check CBC, CMP, TSH, lipid panel, A1c, and PSA  Follow-up in 1 month for well visit    Nicotine dependence, cigarettes, uncomplicated  Patient is currently vaping  Patient currently uses nicotine products  Discussed vape cessation  Discussed that vaping can lead to increased risk for hypertension, hyperlipidemia, strokes, heart attacks, and different types of cancers such as lung and stomach cancer  Discussed medication options including Chantix and dual NRT with patches and/or month lozenges and gum  5 minutes spent on vaping cessation counseling      Class 2 obesity due to excess calories without serious comorbidity with body mass index (BMI) of 38 0 to 38 9 in adult  Discussed with patient the importance of medication adherence  Discussed lifestyle changes including diet and exercise  Diet should include switching from simple carbohydrates like white rice, white pasta, white bread to brown rice, wheat pasta, wheat bread  Increase exercise to 150 minutes per week, should include cardio and weightlifting  Offered nutritional counseling when patient is ready       Diagnoses and all orders for this visit:    Other pulmonary embolism without acute cor pulmonale, unspecified chronicity (HCC)  -     Discontinue: apixaban (ELIQUIS) 5 mg;  Take 1 tablet (5 mg total) by mouth 2 (two) times a day For the first seven days take two tabs twice daily then take one tablet twice daily  -     apixaban (ELIQUIS) 5 mg; Take 1 tablet (5 mg total) by mouth 2 (two) times a day For the first seven days take two tabs twice daily then take one tablet twice daily  Nicotine dependence, cigarettes, uncomplicated    Tobacco abuse counseling    Class 2 obesity due to excess calories without serious comorbidity with body mass index (BMI) of 38 0 to 38 9 in adult  -     HEMOGLOBIN A1C W/ EAG ESTIMATION; Future    Screening for cardiovascular condition  -     Comprehensive metabolic panel; Future  -     Lipid panel; Future  -     Comprehensive metabolic panel  -     Lipid panel    Fatigue, unspecified type  -     CBC and differential; Future  -     CBC and differential    Screening for thyroid disorder  -     TSH, 3rd generation with Free T4 reflex; Future  -     TSH, 3rd generation with Free T4 reflex    Screening for malignant neoplasm of prostate  -     PSA, Total Screen;  Future  -     PSA, Total Screen    Other orders  -     Discontinue: warfarin (Coumadin) 4 mg tablet  -     VITAMIN D PO        Subjective:   Chief Complaint   Patient presents with   [address] Maintenance   Topic Date Due    COVID-19 Vaccine (1) Never done    Hepatitis A Vaccine (1 of 2 - Risk 2-dose series) Never done    Pneumococcal Vaccine: Pediatrics (0 to 5 Years) and At-Risk Patients (6 to 59 Years) (1 - PCV) Never done    BMI: Followup Plan  Never done    Annual Physical  Never done    Hepatitis B Vaccine (1 of 3 - Risk 3-dose series) Never done    DTaP,Tdap,and Td Vaccines (1 - Tdap) Never done    Colorectal Cancer Screening  Never done    Influenza Vaccine (1) 09/01/2022    BMI: Adult  07/20/2023    Depression Remission PHQ  07/20/2023    HIV Screening  Completed    HIB Vaccine  Aged Out    IPV Vaccine  Aged Out    Meningococcal ACWY Vaccine  Aged Out    HPV Vaccine  Aged Out    Hepatitis C Screening  Discontinued Patient ID: Ron Gonzalez is a 52 y o  male  HPI    Patient presents to establish care  Patient has history of DVT and PE  Previously on Eliquis  Recently incarcerated for 4 years  Switch to Coumadin  Would like to transition back  Patient was employed as a  and believes that this likely caused him to have his blood clots  No family history of DVT or PE  Or other clotting disorders  Also has history of chronic hepatitis-C that was treated in the past couple years  Patient is interested in learning more about his erectile dysfunction  Okay with further workup prior to starting Viagra  Currently is vaping  Started this over the past week  Recently released from incarceration  The following portions of the patient's history were reviewed and updated as appropriate: allergies, current medications, past family history, past medical history, past social history, past surgical history, and problem list     Review of Systems   Constitutional: Negative for chills and fever  HENT: Negative for congestion  Respiratory: Negative for cough and shortness of breath  Cardiovascular: Negative for chest pain and palpitations  Gastrointestinal: Negative for diarrhea, nausea and vomiting  Genitourinary: Negative for dysuria  Musculoskeletal: Negative for myalgias  Neurological: Negative for dizziness and headaches  Objective:  /86   Pulse 80   Temp 98 2 °F (36 8 °C)   Resp 16   Ht 5' 4" (1 626 m)   Wt 102 kg (224 lb)   SpO2 97%   BMI 38 45 kg/m²      Physical Exam  Vitals and nursing note reviewed  Constitutional:       General: He is not in acute distress  HENT:      Head: Normocephalic and atraumatic  Eyes:      Conjunctiva/sclera: Conjunctivae normal    Cardiovascular:      Rate and Rhythm: Normal rate and regular rhythm  Pulses: Normal pulses  Heart sounds: No murmur heard    Pulmonary:      Effort: Pulmonary effort is normal  No respiratory distress  Abdominal:      General: Bowel sounds are normal  There is no distension  Palpations: Abdomen is soft  Tenderness: There is no abdominal tenderness  Musculoskeletal:         General: No swelling  Lymphadenopathy:      Cervical: No cervical adenopathy  Neurological:      Mental Status: He is alert  This note has been constructed using a voice recognition system  There may be translation, syntax, or grammatical errors  If you have an questions, please contact the dictating provider

## 2022-07-20 NOTE — PATIENT INSTRUCTIONS
Weight Management   WHAT YOU NEED TO KNOW:   Why is important to manage my weight? Being overweight increases your risk of health conditions such as heart disease, high blood pressure, type 2 diabetes, and certain types of cancer  It can also increase your risk for osteoarthritis, sleep apnea, and other respiratory problems  Aim for a slow, steady weight loss  Even a small amount of weight loss can lower your risk of health problems  What are the risks of being overweight? Extra weight can cause many health problems, including the following:  Diabetes (high blood sugar level)    High blood pressure or high cholesterol    Heart disease    Stroke    Gallbladder or liver disease    Cancer of the colon, breast, prostate, liver, or kidney    Sleep apnea    Arthritis or gout    What do I need to know about screening? Screening is done to check for health conditions before you have signs or symptoms  If you are 28to 79years old, your blood sugar level may be checked every 3 years for signs of prediabetes or diabetes  Your healthcare provider will check your blood pressure at each visit  High blood pressure can lead to a stroke or other problems  Your provider may check for signs of heart disease, cancer, or other health problems  How do I lose weight safely? A safe and healthy way to lose weight is to eat fewer calories and get regular exercise  You can lose up about 1 pound a week by decreasing the number of calories you eat by 500 calories each day  You can decrease calories by eating smaller portion sizes or by cutting out high-calorie foods  Read labels to find out how many calories are in the foods you eat  You can also burn calories with exercise such as walking, swimming, or biking  You will be more likely to keep weight off if you make these changes part of your lifestyle  Exercise at least 30 minutes per day on most days of the week   You can also fit in more physical activity by taking the stairs instead of the elevator or parking farther away from stores  Ask your healthcare provider about the best exercise plan for you  What is a healthy meal plan that can help me manage my weight? A healthy meal plan includes a variety of foods, contains fewer calories, and helps you stay healthy  A healthy meal plan includes the following:     Eat whole-grain foods more often  A healthy meal plan should contain fiber  Fiber is the part of grains, fruits, and vegetables that is not broken down by your body  Whole-grain foods are healthy and provide extra fiber in your diet  Some examples of whole-grain foods are whole-wheat breads and pastas, oatmeal, brown rice, and bulgur  Eat a variety of vegetables every day  Include dark, leafy greens such as spinach, kale, jesse greens, and mustard greens  Eat yellow and orange vegetables such as carrots, sweet potatoes, and winter squash  Eat a variety of fruits every day  Choose fresh or canned fruit (canned in its own juice or light syrup) instead of juice  Fruit juice has very little or no fiber  Eat low-fat dairy foods  Drink fat-free (skim) milk or 1% milk  Eat fat-free yogurt and low-fat cottage cheese  Try low-fat cheeses such as mozzarella and other reduced-fat cheeses  Choose meat and other protein foods that are low in fat  Choose beans or other legumes such as split peas or lentils  Choose fish, skinless poultry (chicken or turkey), or lean cuts of red meat (beef or pork)  Before you cook meat or poultry, cut off any visible fat  Use less fat and oil  Try baking foods instead of frying them  Add less fat, such as margarine, sour cream, regular salad dressing and mayonnaise to foods  Eat fewer high-fat foods  Some examples of high-fat foods include french fries, doughnuts, ice cream, and cakes  Eat fewer sweets  Limit foods and drinks that are high in sugar  This includes candy, cookies, regular soda, and sweetened drinks      What are some ways I can decrease calories? Eat smaller portions  Use a small plate with smaller servings  Do not eat second helpings  When you eat at a restaurant, ask for a box and place half of your meal in the box before you eat  Share an entrée with someone else  Replace high-calorie snacks with healthy, low-calorie snacks  Choose fresh fruit, vegetables, fat-free rice cakes, or air-popped popcorn instead of potato chips, nuts, or chocolate  Choose water or calorie-free drinks instead of soda or sweetened drinks  Do not shop for groceries when you are hungry  You may be more likely to make unhealthy food choices  Take a grocery list of healthy foods and shop after you have eaten  Eat regular meals  Do not skip meals  Skipping meals can lead to overeating later in the day  This can make it harder for you to lose weight  Eat a healthy snack in place of a meal if you do not have time to eat a regular meal  Talk with a dietitian to help you create a meal plan and schedule that is right for you  What other things should I consider as I try to lose weight? Be aware of situations that may give you the urge to overeat, such as eating while watching television  Find ways to avoid these situations  For example, read a book, go for a walk, or do crafts  Meet with a weight loss support group or friends who are also trying to lose weight  This may help you stay motivated to continue working on your weight loss goals  CARE AGREEMENT:   You have the right to help plan your care  Learn about your health condition and how it may be treated  Discuss treatment options with your healthcare providers to decide what care you want to receive  You always have the right to refuse treatment  The above information is an  only  It is not intended as medical advice for individual conditions or treatments   Talk to your doctor, nurse or pharmacist before following any medical regimen to see if it is safe and effective for you  © Copyright [employer] 2022 Information is for End User's use only and may not be sold, redistributed or otherwise used for commercial purposes   All illustrations and images included in CareNotes® are the copyrighted property of A D A M , Inc  or Zahra Kam

## 2022-07-20 NOTE — ASSESSMENT & PLAN NOTE
Previous blood work reviewed and assessable notes reviewed  Patient has history of DVT and PE without any known cause  Unclear if he had any workup for hypercoagulability  Recommend transitioning from warfarin to Eliquis  Will start patient on Eliquis 10 mg b i d  for 1 week and then transition to 5 mg b i d   Advised patient to call office for new script when ready  Patient should be on maintenance dose of 5 mg b i d  for rest of life  Once patient is ready, may refer to Hematology for further workup if needed      Will check CBC, CMP, TSH, lipid panel, A1c, and PSA  Follow-up in 1 month for well visit

## 2022-07-22 LAB
ALBUMIN SERPL-MCNC: 3.9 G/DL (ref 3.6–5.1)
ALBUMIN/GLOB SERPL: 1.2 (CALC) (ref 1–2.5)
ALP SERPL-CCNC: 51 U/L (ref 36–130)
ALT SERPL-CCNC: 11 U/L (ref 9–46)
AST SERPL-CCNC: 14 U/L (ref 10–40)
BASOPHILS # BLD AUTO: 63 CELLS/UL (ref 0–200)
BASOPHILS NFR BLD AUTO: 0.7 %
BILIRUB SERPL-MCNC: 0.2 MG/DL (ref 0.2–1.2)
BUN SERPL-MCNC: 15 MG/DL (ref 7–25)
BUN/CREAT SERPL: ABNORMAL (CALC) (ref 6–22)
CALCIUM SERPL-MCNC: 9.3 MG/DL (ref 8.6–10.3)
CHLORIDE SERPL-SCNC: 105 MMOL/L (ref 98–110)
CHOLEST SERPL-MCNC: 211 MG/DL
CHOLEST/HDLC SERPL: 5.9 (CALC)
CO2 SERPL-SCNC: 31 MMOL/L (ref 20–32)
CREAT SERPL-MCNC: 0.97 MG/DL (ref 0.6–1.29)
EOSINOPHIL # BLD AUTO: 324 CELLS/UL (ref 15–500)
EOSINOPHIL NFR BLD AUTO: 3.6 %
ERYTHROCYTE [DISTWIDTH] IN BLOOD BY AUTOMATED COUNT: 12.9 % (ref 11–15)
EST. AVERAGE GLUCOSE BLD GHB EST-MCNC: 114 MG/DL
EST. AVERAGE GLUCOSE BLD GHB EST-SCNC: 6.3 MMOL/L
GFR/BSA.PRED SERPLBLD CYS-BASED-ARV: 97 ML/MIN/1.73M2
GLOBULIN SER CALC-MCNC: 3.3 G/DL (CALC) (ref 1.9–3.7)
GLUCOSE SERPL-MCNC: 108 MG/DL (ref 65–99)
HBA1C MFR BLD: 5.6 % OF TOTAL HGB
HCT VFR BLD AUTO: 43.4 % (ref 38.5–50)
HDLC SERPL-MCNC: 36 MG/DL
HGB BLD-MCNC: 13.8 G/DL (ref 13.2–17.1)
LDLC SERPL CALC-MCNC: 143 MG/DL (CALC)
LYMPHOCYTES # BLD AUTO: 2871 CELLS/UL (ref 850–3900)
LYMPHOCYTES NFR BLD AUTO: 31.9 %
MCH RBC QN AUTO: 28.3 PG (ref 27–33)
MCHC RBC AUTO-ENTMCNC: 31.8 G/DL (ref 32–36)
MCV RBC AUTO: 89.1 FL (ref 80–100)
MONOCYTES # BLD AUTO: 819 CELLS/UL (ref 200–950)
MONOCYTES NFR BLD AUTO: 9.1 %
NEUTROPHILS # BLD AUTO: 4923 CELLS/UL (ref 1500–7800)
NEUTROPHILS NFR BLD AUTO: 54.7 %
NONHDLC SERPL-MCNC: 175 MG/DL (CALC)
PLATELET # BLD AUTO: 381 THOUSAND/UL (ref 140–400)
PMV BLD REES-ECKER: 9 FL (ref 7.5–12.5)
POTASSIUM SERPL-SCNC: 4.5 MMOL/L (ref 3.5–5.3)
PROT SERPL-MCNC: 7.2 G/DL (ref 6.1–8.1)
PSA SERPL-MCNC: 1.2 NG/ML
RBC # BLD AUTO: 4.87 MILLION/UL (ref 4.2–5.8)
SODIUM SERPL-SCNC: 141 MMOL/L (ref 135–146)
TRIGL SERPL-MCNC: 182 MG/DL
TSH SERPL-ACNC: 0.69 MIU/L (ref 0.4–4.5)
WBC # BLD AUTO: 9 THOUSAND/UL (ref 3.8–10.8)

## 2022-09-13 ENCOUNTER — OFFICE VISIT (OUTPATIENT)
Dept: FAMILY MEDICINE CLINIC | Facility: CLINIC | Age: 47
End: 2022-09-13
Payer: COMMERCIAL

## 2022-09-13 VITALS
HEART RATE: 83 BPM | WEIGHT: 226.2 LBS | TEMPERATURE: 96 F | RESPIRATION RATE: 16 BRPM | BODY MASS INDEX: 38.62 KG/M2 | OXYGEN SATURATION: 99 % | HEIGHT: 64 IN | DIASTOLIC BLOOD PRESSURE: 84 MMHG | SYSTOLIC BLOOD PRESSURE: 134 MMHG

## 2022-09-13 DIAGNOSIS — I82.459 ACUTE DEEP VEIN THROMBOSIS (DVT) OF PERONEAL VEIN, UNSPECIFIED LATERALITY (HCC): ICD-10-CM

## 2022-09-13 DIAGNOSIS — F19.10 POLYSUBSTANCE ABUSE (HCC): Chronic | ICD-10-CM

## 2022-09-13 DIAGNOSIS — I26.99 PULMONARY EMBOLISM WITHOUT ACUTE COR PULMONALE, UNSPECIFIED CHRONICITY, UNSPECIFIED PULMONARY EMBOLISM TYPE (HCC): Primary | ICD-10-CM

## 2022-09-13 DIAGNOSIS — Z00.00 ROUTINE PHYSICAL EXAMINATION: ICD-10-CM

## 2022-09-13 DIAGNOSIS — E66.09 CLASS 2 OBESITY DUE TO EXCESS CALORIES WITHOUT SERIOUS COMORBIDITY WITH BODY MASS INDEX (BMI) OF 38.0 TO 38.9 IN ADULT: ICD-10-CM

## 2022-09-13 DIAGNOSIS — Z59.89 INSURANCE COVERAGE PROBLEMS: ICD-10-CM

## 2022-09-13 DIAGNOSIS — F33.3 MAJOR DEPRESSIVE DISORDER, RECURRENT, SEVERE WITH PSYCHOTIC FEATURES (HCC): ICD-10-CM

## 2022-09-13 PROCEDURE — 99214 OFFICE O/P EST MOD 30 MIN: CPT | Performed by: NURSE PRACTITIONER

## 2022-09-13 PROCEDURE — 99396 PREV VISIT EST AGE 40-64: CPT | Performed by: NURSE PRACTITIONER

## 2022-09-13 SDOH — ECONOMIC STABILITY - INCOME SECURITY: OTHER PROBLEMS RELATED TO HOUSING AND ECONOMIC CIRCUMSTANCES: Z59.89

## 2022-09-13 NOTE — PATIENT INSTRUCTIONS
PSA normal at 1 2  Schedule colonoscopy   Schedule Hematology evaluation   Continue Eliquis   Lab work in 6 months

## 2022-09-13 NOTE — ASSESSMENT & PLAN NOTE
On lifelong Eliquis at this time  Hematology referral placed for formal workup as this was never completed

## 2022-09-13 NOTE — ASSESSMENT & PLAN NOTE
PSA is UTD  Colonoscopy ordered (pt's father with colon cancer in his 46s)  Eye exam is UTD  Dental exam behind and I placed a referral to our  for assistance with dental coverage  Lab work reviewed- repeat in 6 months

## 2022-09-13 NOTE — ASSESSMENT & PLAN NOTE
H/o DVT and PE, unclear etiology, possibly from prolonged inactivity with    Now on Eliquis 5 mg BID  No formal evaluation/ workup with Hematology and pt is interested in this - referral placed

## 2022-09-13 NOTE — ASSESSMENT & PLAN NOTE
Mood has been relatively stable  Pt released from detention in June after 4 years  Family is supportive  Transition has been difficult   He is working again as a   H/o drug use, clean for at least 4 years now  No interest in meds  He is aware we have a therapist in our office if needed

## 2022-09-13 NOTE — PROGRESS NOTES
[facility]    NAME: Ron Gonzalez  AGE: 52 y o   SEX: male  : 1975     DATE: 2022     Assessment and Plan:     Problem List Items Addressed This Visit        Cardiovascular and Mediastinum    Pulmonary embolism without acute cor pulmonale ([facility] [address] ) - Primary     H/o DVT and PE, unclear etiology, possibly from prolonged inactivity with    Now on Eliquis 5 mg BID  No formal evaluation/ workup with Hematology and pt is interested in this - referral placed         Relevant Orders    Ambulatory Referral to Hematology / Oncology    Deep venous thrombosis (DVT) of peroneal vein ([facility] [address] )     On lifelong Eliquis at this time  Hematology referral placed for formal workup as this was never completed          Relevant Orders    Ambulatory Referral to Hematology / Oncology       Other    Major depressive disorder, recurrent, severe with psychotic features ([address] ) (Chronic)     Mood has been relatively stable  Pt released from alf in  after 4 years  Family is supportive  Transition has been difficult   He is working again as a   H/o drug use, clean for at least 4 years now  No interest in meds  He is aware we have a therapist in our office if needed         Relevant Orders    CBC and differential    Comprehensive metabolic panel    TSH, 3rd generation with Free T4 reflex    Polysubstance abuse ([facility] [address] ) (Chronic)     Clean for about 4 years since being incarcerated   Congratulated on this  Encouraged NA         Class 2 obesity due to excess calories without serious comorbidity with body mass index (BMI) of 38 0 to 38 9 in adult     Encouraged lifestyle modifications especially with current job of a           Relevant Orders    CBC and differential    Comprehensive metabolic panel    TSH, 3rd generation with Free T4 reflex    Lipid panel    Routine physical examination     PSA is UTD  Colonoscopy ordered (pt's father with colon cancer in his 46s)  Eye exam is UTD  Dental exam behind and I placed a referral to our  for assistance with dental coverage  Lab work reviewed- repeat in 6 months             Other Visit Diagnoses     Insurance coverage problems        Referral placed to our      Relevant Orders    Ambulatory Referral to Social Work Care Management Program          Immunizations and preventive care screenings were discussed with patient today  Appropriate education was printed on patient's after visit summary  Counseling:  Alcohol/drug use: discussed moderation in alcohol intake, the recommendations for healthy alcohol use, and avoidance of illicit drug use  Dental Health: discussed importance of regular tooth brushing, flossing, and dental visits  Injury prevention: discussed safety/seat belts, safety helmets, smoke detectors, carbon dioxide detectors, and smoking near bedding or upholstery  Sexual health: discussed sexually transmitted diseases, partner selection, use of condoms, avoidance of unintended pregnancy, and contraceptive alternatives  · Exercise: the importance of regular exercise/physical activity was discussed  Recommend exercise 3-5 times per week for at least 30 minutes  BMI Counseling: Body mass index is 38 83 kg/m²  The BMI is above normal  Nutrition recommendations include encouraging healthy choices of fruits and vegetables  Exercise recommendations include moderate physical activity 150 minutes/week  Rationale for BMI follow-up plan is due to patient being overweight or obese  Return in about 6 months (around 3/13/2023)  Chief Complaint:     Chief Complaint   Patient presents with    Physical Exam      History of Present Illness:     Adult Annual Physical   Patient here for a comprehensive physical exam  The patient reports no problems      Works as a - drives to [place], [address] could be better due to his job   Doesn't get much exercise with job as well     Father with colon cancer in his 46s  Pt has never been screened for colorectal cancer  He was recently incarcerated for 4 years, was released this past June  Doing okay with this transition but it has been hard at times  His family is very supportive  His sister is an NP  He has one daughter who is 32years old and currently pregnant     H/o drug use, has been clean for years now     Depression- notes his mood has been relatively well controlled  No current meds and he has no interest in this currently  Not seeing a therapist, also no interest in this  Denies SI/HI    Pt with a h/o PE and DVT  Has never been to Hematology for a formal workup  Was on Eliquis following a DVT, then Coumadin following the PE, now back on Eliquis 5 mg BID which he prefers and is affordable  He feels he developed the DVT and PE from   Vaccines are UTD from being incarcerated       Diet and Physical Activity  · Diet/Nutrition: poor diet  · Exercise: no formal exercise  Depression Screening  PHQ-2/9 Depression Screening         General Health  · Sleep: sleeps well  · Hearing: normal - bilateral   · Vision: no vision problems  · Dental:no regular dental visits-- pt is currently without dental insurance and would like assistance with getting this        Health  · History of STDs?: no      Review of Systems:     Review of Systems   Constitutional: Negative for activity change, appetite change, chills, diaphoresis, fatigue, fever and unexpected weight change  HENT: Negative for ear pain and sore throat  Eyes: Negative for pain and visual disturbance  Respiratory: Negative for cough, shortness of breath and wheezing  Cardiovascular: Negative for chest pain, palpitations and leg swelling  Gastrointestinal: Negative for abdominal pain, blood in stool, constipation, diarrhea, nausea and vomiting  Genitourinary: Negative for dysuria and hematuria  Musculoskeletal: Negative for arthralgias and back pain  Skin: Negative for color change and rash  Neurological: Negative for seizures and syncope  Hematological: Negative for adenopathy  Does not bruise/bleed easily  Psychiatric/Behavioral: Positive for dysphoric mood  Negative for self-injury, sleep disturbance and suicidal ideas  The patient is not nervous/anxious  All other systems reviewed and are negative  Past Medical History:     Past Medical History:   Diagnosis Date    Alcohol abuse 6/26/2016    Anxiety     Depression     DVT (deep venous thrombosis) (HCC)     Hepatitis C     PE (pulmonary embolism)     PTSD (post-traumatic stress disorder)     Suicide attempt ([facility] [address] ) 12/31/2016      Past Surgical History:     No past surgical history on file  Social History:     Social History     Socioeconomic History    Marital status: Single     Spouse name: None    Number of children: None    Years of education: None    Highest education level: None   Occupational History    None   Tobacco Use    Smoking status: Former Smoker    Smokeless tobacco: Former User   Substance and Sexual Activity    Alcohol use: Yes     Comment: socially    Drug use: Not Currently     Types: Marijuana, PCP, Heroin, Cocaine    Sexual activity: Yes     Partners: Female   Other Topics Concern    None   Social History Narrative    None     Social Determinants of Health     Financial Resource Strain: Not on file   Food Insecurity: Not on file   Transportation Needs: Not on file   Physical Activity: Not on file   Stress: Not on file   Social Connections: Not on file   Intimate Partner Violence: Not on file   Housing Stability: Not on file      Family History:     No family history on file     Current Medications:     Current Outpatient Medications   Medication Sig Dispense Refill    apixaban (ELIQUIS) 5 mg Take 1 tablet (5 mg total) by mouth 2 (two) times a day 180 tablet 1    VITAMIN D PO        No current facility-administered medications for this visit  Allergies: Allergies   Allergen Reactions    Lactose Intolerance (Gi) - Food Allergy Other (See Comments)     Intolerance to lactose but can tolerate cheese     Bee Venom     Lac Bovis       Physical Exam:     /84   Pulse 83   Temp (!) 96 °F (35 6 °C) (Tympanic)   Resp 16   Ht 5' 4" (1 626 m)   Wt 103 kg (226 lb 3 2 oz)   SpO2 99%   BMI 38 83 kg/m²     Physical Exam  Vitals and nursing note reviewed  Constitutional:       General: He is not in acute distress  Appearance: He is well-developed  He is obese  He is not ill-appearing or diaphoretic  HENT:      Head: Normocephalic and atraumatic  Eyes:      Extraocular Movements: Extraocular movements intact  Conjunctiva/sclera: Conjunctivae normal       Pupils: Pupils are equal, round, and reactive to light  Neck:      Vascular: No carotid bruit  Cardiovascular:      Rate and Rhythm: Normal rate and regular rhythm  Heart sounds: Normal heart sounds  No murmur heard  Pulmonary:      Effort: Pulmonary effort is normal  No respiratory distress  Breath sounds: Normal breath sounds  Abdominal:      General: Bowel sounds are normal  There is no distension  Palpations: Abdomen is soft  Tenderness: There is no abdominal tenderness  Musculoskeletal:         General: Normal range of motion  Cervical back: Normal range of motion and neck supple  No rigidity or tenderness  Lymphadenopathy:      Cervical: No cervical adenopathy  Skin:     General: Skin is warm and dry  Neurological:      General: No focal deficit present  Mental Status: He is alert and oriented to person, place, and time  Psychiatric:         Mood and Affect: Mood normal          Behavior: Behavior normal          Thought Content:  Thought content normal          Judgment: Judgment normal           Savannah Lopez, [facility]

## 2022-09-15 ENCOUNTER — PATIENT OUTREACH (OUTPATIENT)
Dept: FAMILY MEDICINE CLINIC | Facility: CLINIC | Age: 47
End: 2022-09-15

## 2022-09-15 ENCOUNTER — TELEPHONE (OUTPATIENT)
Dept: HEMATOLOGY ONCOLOGY | Facility: CLINIC | Age: 47
End: 2022-09-15

## 2022-09-15 DIAGNOSIS — F33.3 MAJOR DEPRESSIVE DISORDER, RECURRENT, SEVERE WITH PSYCHOTIC FEATURES (HCC): Primary | ICD-10-CM

## 2022-09-15 NOTE — PROGRESS NOTES
Referral received from PCP with request for OP SWCM to outreach patient and assist in obtaining dental insurance  Patient has [address] MA per chart review  Patient recently released from detention after 4 years; working as a   Will assess any additional needs  Spoke with patient who confirmed he was released from detention in June after 4 years  Patient lives with his brothers in apartment and works as  (is on the road for 2-3 weeks at a time)  Patient confirmed he is able to afford household expenses and does not qualify for SNAP benefits or other assistance programs due to income amount  Patient does not have his own vehicle, other than truck for work  Patient either borrows his brothers' car to get to/from appts or he uses [employer]  Patient states he is overwhelmed with transitioning into the community after being in detention for 4 years  Provided support and offered to refer to PCP office therapist Rocky Cote for additional support  Patient agreed to this but stated he would need to schedule a virtual appt due to his work schedule and not knowing when he will be on the road vs in the area  [facility] message sent to PCP office staff requesting call to patient to schedule virtual therapy appt  Patient confirmed he has [address], [place] but does not have dental or vision insurance  Patient agreed for OP SWCM to outreach [address] to ask about setting up additional coverage for these specialists  Call placed to [address] Member Services [phone] to inquire about dental and vision insurance options for patient  Spoke with Joellen Severs in Dental Department who stated patient's [facility] is inactive (was active from 7/15/2022-8/31/2022)  [address] would have outreached patient to discuss selecting alternate plan    Spoke with [employer] Department Rep who verified insurance is inactive as [address] is no longer active in [place]  Patient has been enrolled in alternate plan but they could not verify what this plan is  Provided Enrollment # [phone] who can verify this  Call placed to Enrollment [phone]  Rep needed consent from patient to discuss insurance plan information to OP SWCM  OP SWCM attempted to conference call patient on the line but patient was not accepting calls at this time  Will attempt additional outreach to patient at later date and will provide enrollment phone # to verify new insurance (may be [address] as this is listed on chart as secondary insurance at this time)

## 2022-09-16 ENCOUNTER — TELEPHONE (OUTPATIENT)
Dept: HEMATOLOGY ONCOLOGY | Facility: CLINIC | Age: 47
End: 2022-09-16

## 2022-09-16 NOTE — TELEPHONE ENCOUNTER
Attempt to schedule new patient appointment  Someone answered phone stating Marie Escalante is not there - they did not identify who they were  When I requested them to pass our info along to have Marie Escalante call back,  they disconnected the call

## 2022-09-19 ENCOUNTER — TELEPHONE (OUTPATIENT)
Dept: PSYCHIATRY | Facility: CLINIC | Age: 47
End: 2022-09-19

## 2022-09-22 ENCOUNTER — PATIENT OUTREACH (OUTPATIENT)
Dept: FAMILY MEDICINE CLINIC | Facility: CLINIC | Age: 47
End: 2022-09-22

## 2022-09-22 NOTE — PROGRESS NOTES
2nd outreach attempt to patient to inform that his [address]  is inactive  Spoke with patient and explained he will need to call Enrollment [phone] to discuss alternate insurance plan options  Recommended patient ask about dental and vision insurance as well  Patient will call to ask about this

## 2022-09-26 ENCOUNTER — TELEPHONE (OUTPATIENT)
Dept: HEMATOLOGY ONCOLOGY | Facility: CLINIC | Age: 47
End: 2022-09-26

## 2022-10-04 ENCOUNTER — PATIENT OUTREACH (OUTPATIENT)
Dept: FAMILY MEDICINE CLINIC | Facility: CLINIC | Age: 47
End: 2022-10-04

## 2022-10-04 NOTE — PROGRESS NOTES
Chart reviewed  Patient scheduled for virtual therapy appt with Rudy Cordova through PCP office on 10/21  Spoke with patient stated he received a new insurance card in the mail (he believes it is [employer])  He is on the road working as  but states his mom confirmed that new insurance card was received  Encouraged patient to have his mom send him a picture of the front and back of the card; asked patient to then upload the pictures to PCP office through [address] as chart still shows [employer] and [employer] at this time  Patient agreed  Patient unsure if this insurance plan provides dental and vision benefits as well  Encouraged him to call Member Services to ask about this  Patient will call OP SWCM back at later time

## 2022-10-14 ENCOUNTER — PATIENT OUTREACH (OUTPATIENT)
Dept: FAMILY MEDICINE CLINIC | Facility: CLINIC | Age: 47
End: 2022-10-14

## 2022-10-14 NOTE — PROGRESS NOTES
Call placed to patient to check status and ask if he spoke with insurance to confirm if he has dental and vision insurance  Patient states he forgot to call  Chart reviewed  [employer] insurance cards have been uploaded through [address]  Call placed to [facility]  Patient has both dental and vision insurance (dental includes 1 exam/cleaning every 180 days and other procedures require prior-auth, vision includes 2 exams per calendar year, 1 pair of glasses/contacts per calendar year)  Informed patient of this  Encouraged patient to call insurance to obtain in-network list of providers once he is ready to establish services with these specialists  Patient agreed  Reminded patient of therapy appt with Shweta Juárez on Friday 10/21  Patient asked if this is virtual as he will be on the road  Spoke with Renetta at PCP office who switched appt to virtual   Patient aware and will log in through [employer] for this appt  No other needs  Will close case at this time

## 2022-11-12 PROBLEM — Z00.00 ROUTINE PHYSICAL EXAMINATION: Status: RESOLVED | Noted: 2022-09-13 | Resolved: 2022-11-12

## 2023-03-28 DIAGNOSIS — I26.99 OTHER PULMONARY EMBOLISM WITHOUT ACUTE COR PULMONALE, UNSPECIFIED CHRONICITY (HCC): ICD-10-CM

## 2023-03-28 RX ORDER — APIXABAN 5 MG/1
TABLET, FILM COATED ORAL
Qty: 180 TABLET | Refills: 1 | Status: SHIPPED | OUTPATIENT
Start: 2023-03-28

## 2023-04-26 ENCOUNTER — APPOINTMENT (EMERGENCY)
Dept: RADIOLOGY | Facility: HOSPITAL | Age: 48
End: 2023-04-26

## 2023-04-26 ENCOUNTER — HOSPITAL ENCOUNTER (EMERGENCY)
Facility: HOSPITAL | Age: 48
Discharge: HOME | End: 2023-04-26
Attending: EMERGENCY MEDICINE

## 2023-04-26 VITALS
HEART RATE: 98 BPM | OXYGEN SATURATION: 98 % | TEMPERATURE: 97.7 F | DIASTOLIC BLOOD PRESSURE: 89 MMHG | RESPIRATION RATE: 18 BRPM | SYSTOLIC BLOOD PRESSURE: 149 MMHG

## 2023-04-26 DIAGNOSIS — S92.502A CLOSED FRACTURE OF PHALANX OF LEFT FOURTH TOE, INITIAL ENCOUNTER: Primary | ICD-10-CM

## 2023-04-26 RX ORDER — ACETAMINOPHEN 325 MG/1
975 TABLET ORAL ONCE
Status: COMPLETED | OUTPATIENT
Start: 2023-04-26 | End: 2023-04-26

## 2023-04-26 RX ADMIN — ACETAMINOPHEN 975 MG: 325 TABLET ORAL at 12:02

## 2023-04-26 NOTE — Clinical Note
Pool Adi was seen and treated in our emergency department on 4/26/2023  No restrictions            Diagnosis:     Chiquita Rodriguez  may return to work on return date  He may return on this date: 04/27/2023         If you have any questions or concerns, please don't hesitate to call        Artur Epstein PA-C    ______________________________           _______________          _______________  Hospital Representative                              Date                                Time

## 2023-04-26 NOTE — ED PROVIDER NOTES
History  Chief Complaint   Patient presents with    Toe Injury     Patient reports falling down 6 carpeted stairs -headstrike +blood thinners  Pt reports left 4th digit toe pain      66-year-old male presents to emergency room for evaluation of left fourth toe pain and injury  Patient states he was walking down his steps in his socks when he slipped and injured it  He denies other injury  He is able to walk with pain  He denies head injury or loss of consciousness  Patient applied a cold compress  Denies wound  History provided by:  Patient      Prior to Admission Medications   Prescriptions Last Dose Informant Patient Reported? Taking? Eliquis 5 MG 4/25/2023  No Yes   Sig: TAKE 1 TABLET BY MOUTH TWICE A DAY   VITAMIN D PO Not Taking  Yes No   Patient not taking: Reported on 4/26/2023      Facility-Administered Medications: None       Past Medical History:   Diagnosis Date    Alcohol abuse 6/26/2016    Anxiety     Depression     DVT (deep venous thrombosis) (HCC)     Hepatitis C     PE (pulmonary embolism)     PTSD (post-traumatic stress disorder)     Suicide attempt ([facility] [address] ) 12/31/2016       History reviewed  No pertinent surgical history  History reviewed  No pertinent family history  I have reviewed and agree with the history as documented  E-Cigarette/Vaping     E-Cigarette/Vaping Substances     Social History     Tobacco Use    Smoking status: Former    Smokeless tobacco: Former   Substance Use Topics    Alcohol use: Yes     Comment: socially    Drug use: Not Currently     Types: Marijuana, PCP, Heroin, Cocaine       Review of Systems   Constitutional: Negative for chills and fever  Musculoskeletal: Positive for joint swelling  Skin: Negative for rash and wound  Neurological: Negative for syncope, weakness and numbness  Physical Exam  Physical Exam  Vitals and nursing note reviewed  Constitutional:       Appearance: Normal appearance  He is well-developed     HENT: Head: Atraumatic  Eyes:      Conjunctiva/sclera: Conjunctivae normal    Cardiovascular:      Pulses: Normal pulses  Musculoskeletal:      Left foot: Decreased range of motion  Normal capillary refill  Swelling, tenderness and bony tenderness present  No deformity or crepitus  Normal pulse  Feet:    Skin:     General: Skin is warm and dry  Capillary Refill: Capillary refill takes less than 2 seconds  Neurological:      Mental Status: He is alert  Psychiatric:         Mood and Affect: Mood normal          Vital Signs  ED Triage Vitals [04/26/23 1130]   Temperature Pulse Respirations Blood Pressure SpO2   97 7 °F (36 5 °C) (!) 121 18 149/89 99 %      Temp Source Heart Rate Source Patient Position - Orthostatic VS BP Location FiO2 (%)   Temporal Monitor Lying Right arm --      Pain Score       5           Vitals:    04/26/23 1130 04/26/23 1243   BP: 149/89    Pulse: (!) 121 98   Patient Position - Orthostatic VS: Lying          Visual Acuity      ED Medications  Medications   acetaminophen (TYLENOL) tablet 975 mg (975 mg Oral Given 4/26/23 1202)       Diagnostic Studies  Results Reviewed     None                 XR toe fourth min 2 views LEFT   ED Interpretation by Femi Harding PA-C (04/26 1238)   Avulsion Fracture 4th middle phalanx seen on lateral                 Procedures  Procedures         ED Course     left 4th toe don taped to 3rd toe by myself, NV intact s/p application  Medical Decision Making  Fracture versus contusion    Amount and/or Complexity of Data Reviewed  Radiology: ordered  Risk  OTC drugs            Disposition  Final diagnoses:   Closed fracture of phalanx of left fourth toe, initial encounter     Time reflects when diagnosis was documented in both MDM as applicable and the Disposition within this note     Time User Action Codes Description Comment    4/26/2023 12:38 PM Karley Alvarez Add [F66 065A] Closed fracture of phalanx of left fourth toe, initial encounter       ED Disposition     ED Disposition   Discharge    Condition   Stable    Date/Time   Wed Apr 26, 2023 12:38 PM    Comment   Letty Tang discharge to home/self care  Follow-up Information     Follow up With Specialties Details Why Contact Info Additional Information    [address] In 3 days  [address] [address]  [address] [facility], [address] [place], [place], [address] [facility] Emergency Department Emergency Medicine  If symptoms worsen [address] [id number]  [address] [facility] [address] [facility], [address] [place], [place], [address]          Discharge Medication List as of 4/26/2023 12:40 PM      CONTINUE these medications which have NOT CHANGED    Details   Eliquis 5 MG TAKE 1 TABLET BY MOUTH TWICE A DAY, Normal      VITAMIN D PO Historical Med             No discharge procedures on file      PDMP Review     None          ED Provider  Electronically Signed by           Jonel Silverio PA-C  04/26/23 5171

## 2023-09-28 ENCOUNTER — OFFICE VISIT (OUTPATIENT)
Dept: FAMILY MEDICINE CLINIC | Facility: CLINIC | Age: 48
End: 2023-09-28
Payer: COMMERCIAL

## 2023-09-28 VITALS
WEIGHT: 224.2 LBS | TEMPERATURE: 97.3 F | DIASTOLIC BLOOD PRESSURE: 98 MMHG | BODY MASS INDEX: 38.28 KG/M2 | HEIGHT: 64 IN | OXYGEN SATURATION: 100 % | SYSTOLIC BLOOD PRESSURE: 149 MMHG | RESPIRATION RATE: 16 BRPM | HEART RATE: 91 BPM

## 2023-09-28 DIAGNOSIS — I26.99 OTHER PULMONARY EMBOLISM WITHOUT ACUTE COR PULMONALE, UNSPECIFIED CHRONICITY (HCC): Primary | ICD-10-CM

## 2023-09-28 DIAGNOSIS — R35.1 NOCTURIA: ICD-10-CM

## 2023-09-28 DIAGNOSIS — Z12.11 SCREEN FOR COLON CANCER: ICD-10-CM

## 2023-09-28 DIAGNOSIS — B18.2 CHRONIC HEPATITIS C WITHOUT HEPATIC COMA (HCC): Chronic | ICD-10-CM

## 2023-09-28 DIAGNOSIS — E66.9 CLASS 2 OBESITY WITHOUT SERIOUS COMORBIDITY WITH BODY MASS INDEX (BMI) OF 38.0 TO 38.9 IN ADULT, UNSPECIFIED OBESITY TYPE: ICD-10-CM

## 2023-09-28 DIAGNOSIS — Z23 NEED FOR VACCINATION: ICD-10-CM

## 2023-09-28 DIAGNOSIS — Z11.4 SCREENING FOR HIV (HUMAN IMMUNODEFICIENCY VIRUS): ICD-10-CM

## 2023-09-28 DIAGNOSIS — E55.9 VITAMIN D DEFICIENCY: ICD-10-CM

## 2023-09-28 DIAGNOSIS — I26.99 PULMONARY EMBOLISM WITHOUT ACUTE COR PULMONALE, UNSPECIFIED CHRONICITY, UNSPECIFIED PULMONARY EMBOLISM TYPE (HCC): ICD-10-CM

## 2023-09-28 DIAGNOSIS — I82.459 ACUTE DEEP VEIN THROMBOSIS (DVT) OF PERONEAL VEIN, UNSPECIFIED LATERALITY (HCC): ICD-10-CM

## 2023-09-28 DIAGNOSIS — Z13.6 SCREENING FOR CARDIOVASCULAR CONDITION: ICD-10-CM

## 2023-09-28 PROCEDURE — 90686 IIV4 VACC NO PRSV 0.5 ML IM: CPT | Performed by: FAMILY MEDICINE

## 2023-09-28 PROCEDURE — 99214 OFFICE O/P EST MOD 30 MIN: CPT | Performed by: FAMILY MEDICINE

## 2023-09-28 PROCEDURE — 90471 IMMUNIZATION ADMIN: CPT | Performed by: FAMILY MEDICINE

## 2023-09-28 NOTE — PROGRESS NOTES
Name: Luciana Moctezuma      : 1975      MRN: [id number]  Encounter Provider: Adela Zelaya MD  Encounter Date: 2023   Encounter department: [address] [place] [address]     1. Other pulmonary embolism without acute cor pulmonale, unspecified chronicity (HCC)  -     apixaban (Eliquis) 5 mg; Take 1 tablet (5 mg total) by mouth 2 (two) times a day    2. Pulmonary embolism without acute cor pulmonale, unspecified chronicity, unspecified pulmonary embolism type (HCC)  -     CBC and differential; Future  -     Ambulatory Referral to Hematology / Oncology; Future    3. Acute deep vein thrombosis (DVT) of peroneal vein, unspecified laterality [facility])  -     Ambulatory Referral to Hematology / Oncology; Future    4. Screening for cardiovascular condition  -     Comprehensive metabolic panel; Future  -     TSH, 3rd generation with Free T4 reflex; Future  -     Hemoglobin A1C; Future  -     Lipid panel; Future  -     UA w Reflex to Microscopic w Reflex to Culture; Future; Expected date: 2023  -     Albumin / creatinine urine ratio; Future    5. Chronic hepatitis C without hepatic coma (HCC)  -     Hepatitis C RNA, quantitative, PCR; Future    6. Screen for colon cancer  -     Ambulatory Referral to Gastroenterology; Future    7. Vitamin D deficiency  -     Vitamin D 25 hydroxy; Future    8. Nocturia  -     PSA, Total Screen; Future    9. Screening for HIV (human immunodeficiency virus)  -     [facility]; Future    10. Class 2 obesity without serious comorbidity with body mass index (BMI) of 38.0 to 38.9 in adult, unspecified obesity type    11. Need for vaccination  -     influenza vaccine, quadrivalent, 0.5 mL, preservative-free, for adult and pediatric patients 6 mos+ (AFLURIA, FLUARIX, FLULAVAL, FLUZONE)      Above diagnoses discussed with patient. Patient is ordered labs. To GI for colonoscopy and follow-up on his hep C.   Patient is status posttreatment for hep C. Patient also sent to hematologist to find out etiologies for his DVT and pulmonary embolism history. Patient knows and understands that he is on Eliquis for life. Patient was educated and counseled on how to be careful on a blood thinner. With patient's permission I ordered an HIV screen and hepatitis C level also a PSA. Patient is asked to lose weight with diet and exercise. RTO 1 month for physical.      BMI Counseling: Body mass index is 38.48 kg/m². The BMI is above normal. Nutrition recommendations include decreasing portion sizes, encouraging healthy choices of fruits and vegetables, limiting drinks that contain sugar and moderation in carbohydrate intake. Exercise recommendations include exercising 3-5 times per week. No pharmacotherapy was ordered. Patient referred to PCP. Rationale for BMI follow-up plan is due to patient being overweight or obese. Subjective      79-year-old male here for his Eliquis refill. Patient has not had a colonoscopy yet. Amenable to getting a flu vaccine today. He has not had any reactions to the flu vaccine in the past.  Patient's father does have a prostate patient possibly prostate cancer. No acute complaints. No SI HI. Review of Systems   Constitutional: Negative for chills, fatigue and fever. HENT: Negative for congestion and sore throat. Eyes: Negative for visual disturbance. Respiratory: Negative for cough and shortness of breath. Cardiovascular: Negative for chest pain and palpitations. Gastrointestinal: Negative for abdominal pain and blood in stool. Genitourinary: Negative for dysuria and hematuria. Skin: Negative for rash. Neurological: Negative for dizziness and headaches. Hematological: Does not bruise/bleed easily. Psychiatric/Behavioral: Negative for dysphoric mood. The patient is not nervous/anxious.         Current Outpatient Medications on File Prior to Visit   Medication Sig   • [DISCONTINUED] apixaban (Eliquis) 5 mg Take 1 tablet (5 mg total) by mouth 2 (two) times a day   • VITAMIN D PO  (Patient not taking: Reported on 4/26/2023)       Objective     /98 (BP Location: Left arm, Patient Position: Sitting, Cuff Size: Adult)   Pulse 91   Temp (!) 97.3 °F (36.3 °C) (Temporal)   Resp 16   Ht 5' 4" (1.626 m)   Wt 102 kg (224 lb 3.2 oz)   SpO2 100%   BMI 38.48 kg/m²     Physical Exam  Vitals reviewed. Constitutional:       General: He is not in acute distress. Appearance: Normal appearance. He is obese. He is not ill-appearing. HENT:      Head: Normocephalic. Right Ear: Tympanic membrane, ear canal and external ear normal.      Left Ear: Tympanic membrane, ear canal and external ear normal.      Nose: Nose normal.      Mouth/Throat:      Mouth: Mucous membranes are moist.      Pharynx: Oropharynx is clear. Eyes:      Extraocular Movements: Extraocular movements intact. Conjunctiva/sclera: Conjunctivae normal.   Neck:      Vascular: No carotid bruit. Cardiovascular:      Rate and Rhythm: Normal rate and regular rhythm. Pulmonary:      Effort: Pulmonary effort is normal.      Breath sounds: Normal breath sounds. Abdominal:      General: Bowel sounds are normal.      Palpations: Abdomen is soft. Tenderness: There is no abdominal tenderness. There is no right CVA tenderness or left CVA tenderness. Musculoskeletal:         General: Normal range of motion. Cervical back: Normal range of motion. Right lower leg: No edema. Left lower leg: No edema. Lymphadenopathy:      Cervical: No cervical adenopathy. Skin:     General: Skin is warm. Findings: No rash. Neurological:      General: No focal deficit present. Mental Status: He is alert and oriented to person, place, and time. Cranial Nerves: No cranial nerve deficit. Psychiatric:         Mood and Affect: Mood normal.         Behavior: Behavior normal.         Thought Content:  Thought content normal.         Judgment: Judgment normal.       Francisco Jack MD

## 2023-10-06 ENCOUNTER — TELEPHONE (OUTPATIENT)
Age: 48
End: 2023-10-06

## 2023-10-27 ENCOUNTER — CONSULT (OUTPATIENT)
Dept: HEMATOLOGY ONCOLOGY | Facility: CLINIC | Age: 48
End: 2023-10-27
Payer: COMMERCIAL

## 2023-10-27 VITALS
OXYGEN SATURATION: 97 % | DIASTOLIC BLOOD PRESSURE: 82 MMHG | TEMPERATURE: 98.3 F | BODY MASS INDEX: 38.07 KG/M2 | HEART RATE: 83 BPM | SYSTOLIC BLOOD PRESSURE: 140 MMHG | HEIGHT: 64 IN | RESPIRATION RATE: 14 BRPM | WEIGHT: 223 LBS

## 2023-10-27 DIAGNOSIS — I26.99 PULMONARY EMBOLISM WITHOUT ACUTE COR PULMONALE, UNSPECIFIED CHRONICITY, UNSPECIFIED PULMONARY EMBOLISM TYPE (HCC): ICD-10-CM

## 2023-10-27 DIAGNOSIS — I82.459 ACUTE DEEP VEIN THROMBOSIS (DVT) OF PERONEAL VEIN, UNSPECIFIED LATERALITY (HCC): ICD-10-CM

## 2023-10-27 PROCEDURE — 99244 OFF/OP CNSLTJ NEW/EST MOD 40: CPT

## 2023-10-27 NOTE — PROGRESS NOTES
Oncology Outpatient Consult Note  Luciana Moctezuma 50 y.o. male MRN: @ Encounter: [id number]        Date:  10/27/2023        CC: History of PE and DVT      HPI:  Luciana Moctezuma is seen for initial consultation 10/27/2023 regarding history of PE and DVT. History of history of polysubstance abuse, PTSD, depression, anxiety, opioid dependence. 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 Patient reports he has abused heroin/cocaine since he was 16 and into his 45s. Patient was incarcerated June 2018 to June 2022 and was on warfarin at that time. Upon his release, he was placed back on Eliquis. Currently patient is a  and drives from [place] to [address] [place] [address] (reports about 9-hour trip each way). Currently, he drives 9 hours/day. Upon further review of his chart, found patient was diagnosed with PE in 2015 and was placed on Eliquis for 3 months. In 8/9/2016, venous duplex study shows acute versus subacute and mostly occlusive DVT is noted in the peroneal veins in the left lower extremity. In 1/2017,  patient had stopped Eliquis due to losing his insurance. In 3/2017, patient presented to the ED with chest pain and possible syncopal episode and was found to have a PE on the CT PE study:  Acute pulmonary embolism in a subsegmental branch in the right lower lobe. Patient was discharged on Eliquis twice daily indefinitely. Patient reports that during the time of his PEs and DVTs he was using heroin and cocaine every day, multiple times a day. Patient reports compliance with Eliquis. Recently had a fall in April 2023 , fell down 6 stairs. He was evaluated in the ED and negative for bleed. Currently, patient is on parole and reports he does not use drugs. He was referred to us for a work-up of his DVT/PEs. Patient reports no family history of blood clots. His father has prostate cancer.     ROS: As stated in history of present illness otherwise her 14 point review of systems today was negative. ECOG PS: 0      Test Results:    Imaging: No results found.     Labs:   Lab Results   Component Value Date    WBC 9.0 07/22/2022    HGB 13.8 07/22/2022    HCT 43.4 07/22/2022    MCV 89.1 07/22/2022     07/22/2022     Lab Results   Component Value Date     (H) 10/03/2015    K 4.5 07/22/2022     07/22/2022    CO2 31 07/22/2022    ANIONGAP 8 (L) 11/01/2015    BUN 15 07/22/2022    CREATININE 0.97 07/22/2022    GLUCOSE 76 11/24/2016    CALCIUM 9.3 07/22/2022    AST 14 07/22/2022    ALT 11 07/22/2022    ALKPHOS 51 07/22/2022    PROT 7.3 10/03/2015    BILITOT 0.33 10/03/2015    EGFR 97 07/22/2022       Lab Results   Component Value Date    PSA 1.20 07/22/2022       Lab Results   Component Value Date    [id number] 573 01/06/2017       Active Problems:   Patient Active Problem List   Diagnosis    Alcohol abuse    Pulmonary embolism without acute cor pulmonale (HCC)    Elevated liver enzymes    History of pulmonary embolism    Hepatitis C    Opioid type dependence, continuous (HCC)    Major depressive disorder, recurrent, severe with psychotic features ([address])    PTSD (post-traumatic stress disorder)    Cocaine abuse, episodic (HCC)    Cannabis abuse, episodic    Long term current use of anticoagulant therapy    Knee pain    Concussion injury of body structure    Hand injuries    Deep venous thrombosis (DVT) of peroneal vein (HCC)    Tobacco use    Vitamin D deficiency    Polysubstance abuse ([address])    Tobacco abuse counseling    Nicotine dependence, cigarettes, uncomplicated    Class 2 obesity due to excess calories without serious comorbidity with body mass index (BMI) of 38.0 to 38.9 in adult       Past Medical History:   Past Medical History:   Diagnosis Date    Alcohol abuse 6/26/2016    Anxiety     Depression     DVT (deep venous thrombosis) (HCC)     Hepatitis C     PE (pulmonary embolism)     PTSD (post-traumatic stress disorder)     Suicide attempt ([address] [facility]) 12/31/2016       Surgical History: No past surgical history on file. Family History:  No family history on file. Cancer-related family history is not on file. Social History:   Social History     Socioeconomic History    Marital status: Single     Spouse name: Not on file    Number of children: Not on file    Years of education: Not on file    Highest education level: Not on file   Occupational History    Not on file   Tobacco Use    Smoking status: Former    Smokeless tobacco: Former   Substance and Sexual Activity    Alcohol use: Yes     Comment: socially    Drug use: Not Currently     Types: Marijuana, PCP, Heroin, Cocaine    Sexual activity: Yes     Partners: Female   Other Topics Concern    Not on file   Social History Narrative    Not on file     Social Determinants of Health     Financial Resource Strain: Not on file   Food Insecurity: Not on file   Transportation Needs: Not on file   Physical Activity: Not on file   Stress: Not on file   Social Connections: Not on file   Intimate Partner Violence: Not on file   Housing Stability: Not on file       Current Medications:   Current Outpatient Medications   Medication Sig Dispense Refill    apixaban (Eliquis) 5 mg Take 1 tablet (5 mg total) by mouth 2 (two) times a day 180 tablet 3    VITAMIN D PO  (Patient not taking: Reported on 4/26/2023)       No current facility-administered medications for this visit. Allergies: Allergies   Allergen Reactions    Lactose Intolerance (Gi) - Food Allergy Other (See Comments)     Intolerance to lactose but can tolerate cheese     Bee Venom     Milk (Cow)          Physical Exam:    Body surface area is 2.05 meters squared.     Wt Readings from Last 3 Encounters:   10/27/23 101 kg (223 lb)   09/28/23 102 kg (224 lb 3.2 oz)   09/13/22 103 kg (226 lb 3.2 oz)        Temp Readings from Last 3 Encounters:   10/27/23 98.3 °F (36.8 °C) (Temporal)   09/28/23 (!) 97.3 °F (36.3 °C) (Temporal)   04/26/23 97.7 °F (36.5 °C) (Temporal)        BP Readings from Last 3 Encounters:   10/27/23 140/82   09/28/23 149/98   04/26/23 149/89         Pulse Readings from Last 3 Encounters:   10/27/23 83   09/28/23 91   04/26/23 98     @LASTSAO2(3)@    Physical Exam     Constitutional   General appearance: No acute distress, well appearing and well nourished. Eyes   Conjunctiva and lids: No swelling, erythema or discharge. Pupils and irises: Equal, round and reactive to light. Ears, Nose, Mouth, and Throat   External inspection of ears and nose: Normal.    Nasal mucosa, septum, and turbinates: Normal without edema or erythema. Oropharynx: Normal with no erythema, edema, exudate or lesions. Pulmonary   Respiratory effort: No increased work of breathing or signs of respiratory distress. Auscultation of lungs: Clear to auscultation. Cardiovascular   Palpation of heart: Normal PMI, no thrills. Auscultation of heart: Normal rate and rhythm, normal S1 and S2, without murmurs. Examination of extremities for edema and/or varicosities: Normal.    Carotid pulses: Normal.    Abdomen   Abdomen: Non-tender, no masses. Liver and spleen: No hepatomegaly or splenomegaly. Lymphatic   Palpation of lymph nodes in neck: No lymphadenopathy. Musculoskeletal   Gait and station: Normal.    Digits and nails: Normal without clubbing or cyanosis. Inspection/palpation of joints, bones, and muscles: Normal.    Skin   Skin and subcutaneous tissue: Normal without rashes or lesions. Neurologic   Cranial nerves: Cranial nerves 2-12 intact. Sensation: No sensory loss. Psychiatric   Orientation to person, place, and time: Normal.    Mood and affect: Normal.          Assessment/ Plan:  Discussed risk factors for VTEs with Azar Carrasco, which include but not limited to, increasing age, prolonged immobility, surgery, trauma, malignancy, CHF, blood disorders that alter blood viscosity.     We discussed his multiple VTE's were likely provoked by his long distance trips and excessive polysubstance abuse. Due to the multiple events, I agree with anticoagulation indefinitely. Patient does report he does miss doses intermittently and tends to double dose. Discussed the importance of compliance with taking the medication every day around the same time and not missing a dose. If a dose is missed, NOT to take a double dose the next day to make up for the missed dose. Discussed safety concerns while on anticoagulation:  Ambulate in a clear path, avoid falls. If patient falls, to call 911 and get to the hospital, especially if there is a head injury. If patient notices abnormal bleeding, to go to the hospital.  Patient verbalized understanding. Patient would like to move forward with testing for hereditary factors. Informed him that these tests need to be approved by insurance therefore, our preauthorization team will be in touch with him once it is approved. We will proceed by obtaining factor V Leiden, prothrombin gene mutation, cardiolipin antibody, beta-2 glycoprotein. Informed him there are other test we can obtain however, his tests are not recommended while patient is on anticoagulation. I would not recommend going off of anticoagulation to obtain these tests as his VTE events were likely provoked as discussed above. Patient agreeable with plan. He will obtain these labs once approved at the end of the month and then I will see him back at the end of December as he is only in town at the end of each month. I spent 50 minutes in chart review, face to face counseling, coordination of care, and documentation.

## 2023-11-14 ENCOUNTER — TELEPHONE (OUTPATIENT)
Dept: HEMATOLOGY ONCOLOGY | Facility: CLINIC | Age: 48
End: 2023-11-14

## 2023-11-14 NOTE — TELEPHONE ENCOUNTER
Spoke with patient to let him know his specialty labs have been approved to be drawn at [address]. I also informed pt I will mychart message the orders to him just in case.  Pt verbalized understanding and is in agreement with plan

## 2023-11-14 NOTE — TELEPHONE ENCOUNTER
Patient Call    Who are you speaking with? Patient    If it is not the patient, are they listed on an active communication consent form? N/A   What is the reason for this call? Patient states he was told his blood work needed approval, and someone had called him to tell him to call us back but I don't see any information pertaining to who called him and why. He would like a call back advsing him what he needs to do   Does this require a call back? Yes   If a call back is required, please list best call back number [phone]   If a call back is required, advise that a message will be forwarded to their care team and someone will return their call as soon as possible. Did you relay this information to the patient?  Yes

## 2024-04-24 ENCOUNTER — OFFICE VISIT (OUTPATIENT)
Dept: GASTROENTEROLOGY | Facility: CLINIC | Age: 49
End: 2024-04-24
Payer: COMMERCIAL

## 2024-04-24 ENCOUNTER — TELEPHONE (OUTPATIENT)
Dept: GASTROENTEROLOGY | Facility: CLINIC | Age: 49
End: 2024-04-24

## 2024-04-24 VITALS
BODY MASS INDEX: 33.8 KG/M2 | HEIGHT: 64 IN | TEMPERATURE: 97.5 F | DIASTOLIC BLOOD PRESSURE: 84 MMHG | SYSTOLIC BLOOD PRESSURE: 128 MMHG | WEIGHT: 198 LBS

## 2024-04-24 DIAGNOSIS — Z12.11 SCREEN FOR COLON CANCER: ICD-10-CM

## 2024-04-24 DIAGNOSIS — I26.99 OTHER PULMONARY EMBOLISM WITHOUT ACUTE COR PULMONALE, UNSPECIFIED CHRONICITY (HCC): ICD-10-CM

## 2024-04-24 DIAGNOSIS — B19.20 HEPATITIS C VIRUS INFECTION WITHOUT HEPATIC COMA, UNSPECIFIED CHRONICITY: Primary | Chronic | ICD-10-CM

## 2024-04-24 PROCEDURE — 99203 OFFICE O/P NEW LOW 30 MIN: CPT | Performed by: INTERNAL MEDICINE

## 2024-04-24 RX ORDER — POLYETHYLENE GLYCOL 3350, SODIUM SULFATE ANHYDROUS, SODIUM BICARBONATE, SODIUM CHLORIDE, POTASSIUM CHLORIDE 236; 22.74; 6.74; 5.86; 2.97 G/4L; G/4L; G/4L; G/4L; G/4L
4000 POWDER, FOR SOLUTION ORAL ONCE
Qty: 4000 ML | Refills: 0 | Status: SHIPPED | OUTPATIENT
Start: 2024-04-24 | End: 2024-04-24

## 2024-04-24 NOTE — TELEPHONE ENCOUNTER
Our mutual patient is scheduled for procedure: colonoscopy    On: August 29 , 2024     With: Dr. Berry Funk MD    He/She is taking the following blood thinner: Eliquis (Apixaban)    Can this be stopped  2 days prior to the procedure    Physician Approving clearance:            Please review and approve.  Thank you

## 2024-04-24 NOTE — TELEPHONE ENCOUNTER
Medication: apixaban (Eliquis) 5 mg       Dose/Frequency: Take 1 tablet (5 mg total) by mouth 2 (two) times a day     Quantity: 180    Pharmacy: [facility]/pharmacy #[id number] - [place], [place] [address] [phone]     Office:   [x] PCP/Provider -   [] Speciality/Provider -     Does the patient have enough for 3 days?   [x] Yes   [] No - Send as HP to POD      patient is a  and leaving today to go back to [place]

## 2024-04-24 NOTE — TELEPHONE ENCOUNTER
Patient is calling back to follow up on prior phone call regarding the medication refill Carlotta took about 10 minutes ago, I advised the patient that it was just sent out 10 minutes ago so we have to give it some time to go through the proper channels and get the refill sent, patient will like a call when medicine is sent, patient is currently on parole and only comes out to [place] every 3 months, pt is leaving for [place] and is on very limited time so he is trying to get all his doctor visits and medications so he can take with him.   Migraine without aura and without status migrainosus, not intractable

## 2024-04-24 NOTE — PATIENT INSTRUCTIONS
Scheduled date of colonoscopy (as of today) :08/29/2024  Physician performing colonoscopy: Good  Location of colonoscopy:[facility]  Bowel prep reviewed with patient:Mj / Dulcolax  Instructions reviewed with patient by: YULISSA Marleys Eliquis sent for clearance    Patient was also given labs to have done and scheduled an U/S for patient  as well .

## 2024-05-01 NOTE — PROGRESS NOTES
[facility] - Outpatient Consultation  Cipriano Bar 49 y.o. male MRN: [id number]  Encounter: [id number]          ASSESSMENT AND PLAN:    Cipriano Bar is a 49 y.o. male with PMH of HCV,  presenting for colon cancer screening.     Screening for Colon Cancer  - Colonoscopy now  - Golytely/miralax prep    HCV Hx  - Recheck CMP, HCV viral load   - Elastography    1. Hepatitis C virus infection without hepatic coma, unspecified chronicity    2. Screen for colon cancer        Orders Placed This Encounter   Procedures    Hepatitis C RNA, Quantitative PCR, [facility]    US elastography    Comprehensive metabolic panel    Colonoscopy     ______________________________________________________________________    HPI:    Cipriano Bar is a 49 y.o. male with PMH of HCV,  presenting for colon cancer screening.     No alarm signs, no abd pain, change in bowel habits, blood in stool, FH of CRC. Has not had any endoscopic evaluation before.     HCV hx, states he was treated in care home. Last LFTs 2022.    REVIEW OF SYSTEMS:    CONSTITUTIONAL: Denies any fever, chills, rigors, and weight loss.  HEENT: No earache or tinnitus. Denies hearing loss or visual disturbances.  CARDIOVASCULAR: No chest pain or palpitations.   RESPIRATORY: Denies any cough, hemoptysis, shortness of breath or dyspnea on exertion.  GASTROINTESTINAL: As noted in the History of Present Illness.   GENITOURINARY: No problems with urination. Denies any hematuria or dysuria.  NEUROLOGIC: No dizziness or vertigo, denies headaches.   MUSCULOSKELETAL: Denies any muscle or joint pain.   SKIN: Denies skin rashes or itching.   ENDOCRINE: Denies excessive thirst. Denies intolerance to heat or cold.  PSYCHOSOCIAL: Denies depression or anxiety. Denies any recent memory loss.       Historical Information   Past Medical History:   Diagnosis Date    Alcohol abuse 6/26/2016    Anxiety     Depression     DVT (deep venous thrombosis) (HCC)      "Hepatitis C     PE (pulmonary embolism)     PTSD (post-traumatic stress disorder)     Suicide attempt (HCC) 12/31/2016     History reviewed. No pertinent surgical history.  Social History   Social History     Substance and Sexual Activity   Alcohol Use Yes    Comment: socially     Social History     Substance and Sexual Activity   Drug Use Not Currently    Types: Marijuana, PCP, Heroin, Cocaine     Social History     Tobacco Use   Smoking Status Former   Smokeless Tobacco Former     History reviewed. No pertinent family history.    Meds/Allergies       Current Outpatient Medications:     polyethylene glycol (Golytely) 4000 mL solution    apixaban (Eliquis) 5 mg    VITAMIN D PO    Allergies   Allergen Reactions    Lactose Intolerance (Gi) - Food Allergy Other (See Comments)     Intolerance to lactose but can tolerate cheese     Bee Venom     Milk (Cow)            Objective     Blood pressure 128/84, temperature 97.5 °F (36.4 °C), temperature source Tympanic, height 5' 4\" (1.626 m), weight 89.8 kg (198 lb).   Body mass index is 33.99 kg/m².  Wt Readings from Last 3 Encounters:   04/24/24 89.8 kg (198 lb)   10/27/23 101 kg (223 lb)   09/28/23 102 kg (224 lb 3.2 oz)        PHYSICAL EXAM:      General Appearance:   Alert, cooperative, no distress   HEENT:   Normocephalic, atraumatic, anicteric.     Neck:  Supple, symmetrical, trachea midline   Lungs:   Clear to auscultation bilaterally; no rales, rhonchi or wheezing; respirations unlabored    Heart::   Regular rate and rhythm; no murmur, rub, or gallop.   Abdomen:   Soft, non-tender, non-distended; normal bowel sounds; no masses, no organomegaly    Genitalia:   Deferred    Rectal:   Deferred    Extremities:  No cyanosis, clubbing or edema    Pulses:  2+ and symmetric    Skin:  No jaundice, rashes, or lesions    Lymph nodes:  No palpable cervical lymphadenopathy        Lab Results:         Lab Units 07/22/22  0710 05/17/22  1104   SODIUM mmol/L 141 143   POTASSIUM mmol/L " "4.5 4.0   CHLORIDE mmol/L 105 106   CO2 mmol/L 31 29   BUN mg/dL 15 9   CREATININE mg/dL 0.97 0.90   GLUCOSE RANDOM mg/dL 108* 122*   CALCIUM mg/dL 9.3 9.5            Lab Units 07/22/22  0710 05/17/22  1104   TOTAL PROTEIN g/dL 7.2 7.5   ALBUMIN g/dL 3.9  --    TOTAL BILIRUBIN mg/dL 0.2 0.3   AST U/L 14 14*   ALT U/L 11 12   ALK PHOS U/L 51 65           Lab Units 07/22/22  0710   WHITE BLOOD CELL COUNT. Thousand/uL 9.0   HEMOGLOBIN. g/dL 13.8   HEMATOCRIT. % 43.4   PLATELETS. Thousand/uL 381   MCV. fL 89.1   MCH. pg 28.3   MCHC. g/dL 31.8*   RDW. % 12.9   NEUTROS PCT. % 54.7   LYMPHS PCT. % 31.9   MONOS PCT. % 9.1   EOS PCT. % 3.6   NEUTROS ABS. cells/uL 4,923   LYMPHS ABS. cells/uL 2,871   MONOS ABS. cells/uL 819   EOS ABS. cells/uL 324   BASOS ABS. cells/uL 63       No results for input(s): \"IRON\", \"TRANSFERRIN\", \"TRANSFERSAT\", \"FERRITIN\", \"RETIC\" in the last 35524 hours.    No results found for: \"CRP\"    Lab Results   Component Value Date    [id number] 1.250 06/22/2016       Radiology Results:   No results found.    Gastroenterological Procedures:       Hayden Ruiz MD  PGY-5 Gastroenterology Fellow  5/1/2024 3:23 PM    "

## 2024-05-08 ENCOUNTER — TELEPHONE (OUTPATIENT)
Dept: FAMILY MEDICINE CLINIC | Facility: CLINIC | Age: 49
End: 2024-05-08

## 2024-05-20 DIAGNOSIS — I26.99 OTHER PULMONARY EMBOLISM WITHOUT ACUTE COR PULMONALE, UNSPECIFIED CHRONICITY (HCC): ICD-10-CM

## 2024-05-20 NOTE — TELEPHONE ENCOUNTER
Patient is calling to request refill medication on the pended med, would also like samples if available at the office

## 2024-05-27 DIAGNOSIS — I26.99 OTHER PULMONARY EMBOLISM WITHOUT ACUTE COR PULMONALE, UNSPECIFIED CHRONICITY (HCC): ICD-10-CM

## 2024-05-27 DIAGNOSIS — E55.9 VITAMIN D DEFICIENCY: Primary | ICD-10-CM

## 2024-05-28 RX ORDER — CHOLECALCIFEROL (VITAMIN D3) 50 MCG
2000 TABLET ORAL DAILY
Qty: 90 TABLET | Refills: 0 | Status: SHIPPED | OUTPATIENT
Start: 2024-05-28

## 2024-06-13 NOTE — TELEPHONE ENCOUNTER
Patient called requesting refill for eliquis. Patient made aware medication was refilled on 5/28/24 for 180 tablet with 0 refills to [facility] pharmacy. Patient instructed to contact the pharmacy to obtain refills of medication. Patient verbalized understanding.

## 2024-08-12 ENCOUNTER — TELEPHONE (OUTPATIENT)
Dept: GASTROENTEROLOGY | Facility: CLINIC | Age: 49
End: 2024-08-12

## 2024-09-05 ENCOUNTER — TELEPHONE (OUTPATIENT)
Dept: GASTROENTEROLOGY | Facility: CLINIC | Age: 49
End: 2024-09-05

## 2024-09-09 ENCOUNTER — TELEPHONE (OUTPATIENT)
Age: 49
End: 2024-09-09

## 2024-09-09 DIAGNOSIS — I26.99 OTHER PULMONARY EMBOLISM WITHOUT ACUTE COR PULMONALE, UNSPECIFIED CHRONICITY (HCC): ICD-10-CM

## 2024-09-09 RX ORDER — APIXABAN 5 MG/1
5 TABLET, FILM COATED ORAL 2 TIMES DAILY
Qty: 180 TABLET | Refills: 0 | Status: SHIPPED | OUTPATIENT
Start: 2024-09-09

## 2024-09-09 NOTE — TELEPHONE ENCOUNTER
Pt called stating he is having some trouble with his insurance. He was on [employer] but somehow [employer] is now involved. He is trying to get that all situated before he requests another refill of Eliquis. He states he still has a few bottles left. He is also waiting to hear from is [employer] officer to find out when he can come back to [place] to schedule a follow up with Dr. Dawson. I let him know that I would send the message back so that his care team knew what was going on with him.    PRAVEEN

## 2024-12-20 DIAGNOSIS — I26.99 OTHER PULMONARY EMBOLISM WITHOUT ACUTE COR PULMONALE, UNSPECIFIED CHRONICITY (HCC): ICD-10-CM

## 2024-12-23 RX ORDER — APIXABAN 5 MG/1
5 TABLET, FILM COATED ORAL 2 TIMES DAILY
Qty: 180 TABLET | Refills: 0 | Status: SHIPPED | OUTPATIENT
Start: 2024-12-23